# Patient Record
Sex: FEMALE
[De-identification: names, ages, dates, MRNs, and addresses within clinical notes are randomized per-mention and may not be internally consistent; named-entity substitution may affect disease eponyms.]

---

## 2017-06-04 ENCOUNTER — LAB SERVICES (OUTPATIENT)
Dept: OTHER | Age: 27
End: 2017-06-04

## 2017-06-04 ENCOUNTER — CHARTING TRANS (OUTPATIENT)
Dept: OTHER | Age: 27
End: 2017-06-04

## 2017-06-07 LAB
CULTURE STREP GRP A (STTH) HL: NORMAL
RAPID STREP GROUP A: NORMAL

## 2018-11-03 VITALS
RESPIRATION RATE: 16 BRPM | TEMPERATURE: 98.4 F | HEIGHT: 72 IN | BODY MASS INDEX: 26.67 KG/M2 | DIASTOLIC BLOOD PRESSURE: 68 MMHG | HEART RATE: 82 BPM | SYSTOLIC BLOOD PRESSURE: 106 MMHG | WEIGHT: 196.88 LBS

## 2023-03-02 ENCOUNTER — APPOINTMENT (OUTPATIENT)
Dept: CT IMAGING | Facility: HOSPITAL | Age: 33
End: 2023-03-02
Attending: EMERGENCY MEDICINE
Payer: COMMERCIAL

## 2023-03-02 ENCOUNTER — APPOINTMENT (OUTPATIENT)
Dept: GENERAL RADIOLOGY | Facility: HOSPITAL | Age: 33
End: 2023-03-02
Attending: EMERGENCY MEDICINE
Payer: COMMERCIAL

## 2023-03-02 ENCOUNTER — HOSPITAL ENCOUNTER (INPATIENT)
Facility: HOSPITAL | Age: 33
LOS: 1 days | Discharge: HOME | End: 2023-03-03
Attending: EMERGENCY MEDICINE | Admitting: HOSPITALIST
Payer: COMMERCIAL

## 2023-03-02 ENCOUNTER — APPOINTMENT (OUTPATIENT)
Dept: ULTRASOUND IMAGING | Facility: HOSPITAL | Age: 33
End: 2023-03-02
Attending: HOSPITALIST
Payer: COMMERCIAL

## 2023-03-02 DIAGNOSIS — I26.93 SINGLE SUBSEGMENTAL PULMONARY EMBOLISM WITHOUT ACUTE COR PULMONALE (HCC): Primary | ICD-10-CM

## 2023-03-02 DIAGNOSIS — I26.94 MULTIPLE SUBSEGMENTAL PULMONARY EMBOLI WITHOUT ACUTE COR PULMONALE (HCC): ICD-10-CM

## 2023-03-02 PROBLEM — I26.99 PULMONARY EMBOLI (HCC): Status: ACTIVE | Noted: 2023-03-02

## 2023-03-02 LAB
ALBUMIN SERPL-MCNC: 3.2 G/DL (ref 3.4–5)
ALBUMIN/GLOB SERPL: 0.6 {RATIO} (ref 1–2)
ALP LIVER SERPL-CCNC: 77 U/L
ALT SERPL-CCNC: 58 U/L
ANION GAP SERPL CALC-SCNC: 4 MMOL/L (ref 0–18)
ANION GAP SERPL CALC-SCNC: 5 MMOL/L (ref 0–18)
APTT PPP: 110.2 SECONDS (ref 23.3–35.6)
APTT PPP: 27.5 SECONDS (ref 23.3–35.6)
AST SERPL-CCNC: 44 U/L (ref 15–37)
ATRIAL RATE: 92 BPM
B-HCG UR QL: NEGATIVE
BASOPHILS # BLD AUTO: 0.01 X10(3) UL (ref 0–0.2)
BASOPHILS NFR BLD AUTO: 0.4 %
BILIRUB SERPL-MCNC: 0.5 MG/DL (ref 0.1–2)
BUN BLD-MCNC: 6 MG/DL (ref 7–18)
BUN BLD-MCNC: 7 MG/DL (ref 7–18)
CALCIUM BLD-MCNC: 8.5 MG/DL (ref 8.5–10.1)
CALCIUM BLD-MCNC: 8.6 MG/DL (ref 8.5–10.1)
CHLORIDE SERPL-SCNC: 113 MMOL/L (ref 98–112)
CHLORIDE SERPL-SCNC: 116 MMOL/L (ref 98–112)
CO2 SERPL-SCNC: 20 MMOL/L (ref 21–32)
CO2 SERPL-SCNC: 21 MMOL/L (ref 21–32)
CREAT BLD-MCNC: 0.48 MG/DL
CREAT BLD-MCNC: 0.55 MG/DL
D DIMER PPP FEU-MCNC: 1.73 UG/ML FEU (ref ?–0.5)
EOSINOPHIL # BLD AUTO: 0.01 X10(3) UL (ref 0–0.7)
EOSINOPHIL NFR BLD AUTO: 0.4 %
ERYTHROCYTE [DISTWIDTH] IN BLOOD BY AUTOMATED COUNT: 13.1 %
ERYTHROCYTE [DISTWIDTH] IN BLOOD BY AUTOMATED COUNT: 13.4 %
GFR SERPLBLD BASED ON 1.73 SQ M-ARVRAT: 125 ML/MIN/1.73M2 (ref 60–?)
GFR SERPLBLD BASED ON 1.73 SQ M-ARVRAT: 129 ML/MIN/1.73M2 (ref 60–?)
GLOBULIN PLAS-MCNC: 5.2 G/DL (ref 2.8–4.4)
GLUCOSE BLD-MCNC: 101 MG/DL (ref 70–99)
GLUCOSE BLD-MCNC: 78 MG/DL (ref 70–99)
HCT VFR BLD AUTO: 24.2 %
HCT VFR BLD AUTO: 24.3 %
HELMET CELLS BLD QL SMEAR: PRESENT
HGB BLD-MCNC: 8.9 G/DL
HGB BLD-MCNC: 9.1 G/DL
IMM GRANULOCYTES # BLD AUTO: 0.01 X10(3) UL (ref 0–1)
IMM GRANULOCYTES NFR BLD: 0.4 %
INR BLD: 1.03 (ref 0.85–1.16)
LYMPHOCYTES # BLD AUTO: 1.59 X10(3) UL (ref 1–4)
LYMPHOCYTES NFR BLD AUTO: 57.2 %
MCH RBC QN AUTO: 44.3 PG (ref 26–34)
MCH RBC QN AUTO: 45 PG (ref 26–34)
MCHC RBC AUTO-ENTMCNC: 36.6 G/DL (ref 31–37)
MCHC RBC AUTO-ENTMCNC: 37.6 G/DL (ref 31–37)
MCV RBC AUTO: 119.8 FL
MCV RBC AUTO: 120.9 FL
MONOCYTES # BLD AUTO: 0.17 X10(3) UL (ref 0.1–1)
MONOCYTES NFR BLD AUTO: 6.1 %
NEUTROPHILS # BLD AUTO: 0.99 X10 (3) UL (ref 1.5–7.7)
NEUTROPHILS # BLD AUTO: 0.99 X10(3) UL (ref 1.5–7.7)
NEUTROPHILS NFR BLD AUTO: 35.5 %
NRBC # BLD AUTO: 0.19 X10(3) UL
NRBC # BLD AUTO: 0.19 X10(3) UL
NRBC BLD-RTO: 6.8 %
NRBC BLD-RTO: 7 %
OSMOLALITY SERPL CALC.SUM OF ELEC: 280 MOSM/KG (ref 275–295)
OSMOLALITY SERPL CALC.SUM OF ELEC: 292 MOSM/KG (ref 275–295)
P AXIS: 59 DEGREES
P-R INTERVAL: 156 MS
PLATELET # BLD AUTO: 428 10(3)UL (ref 150–450)
PLATELET # BLD AUTO: 437 10(3)UL (ref 150–450)
PLATELET MORPHOLOGY: NORMAL
POTASSIUM SERPL-SCNC: 3.3 MMOL/L (ref 3.5–5.1)
POTASSIUM SERPL-SCNC: 3.5 MMOL/L (ref 3.5–5.1)
PROT SERPL-MCNC: 8.4 G/DL (ref 6.4–8.2)
PROTHROMBIN TIME: 13.5 SECONDS (ref 11.6–14.8)
Q-T INTERVAL: 388 MS
QRS DURATION: 86 MS
QTC CALCULATION (BEZET): 479 MS
R AXIS: 47 DEGREES
RBC # BLD AUTO: 2.01 X10(6)UL
RBC # BLD AUTO: 2.02 X10(6)UL
SARS-COV-2 RNA RESP QL NAA+PROBE: NOT DETECTED
SODIUM SERPL-SCNC: 137 MMOL/L (ref 136–145)
SODIUM SERPL-SCNC: 142 MMOL/L (ref 136–145)
T AXIS: 45 DEGREES
TROPONIN I HIGH SENSITIVITY: 3 NG/L
VENTRICULAR RATE: 92 BPM
WBC # BLD AUTO: 2.8 X10(3) UL (ref 4–11)
WBC # BLD AUTO: 5.9 X10(3) UL (ref 4–11)

## 2023-03-02 PROCEDURE — 93970 EXTREMITY STUDY: CPT | Performed by: HOSPITALIST

## 2023-03-02 PROCEDURE — 99223 1ST HOSP IP/OBS HIGH 75: CPT | Performed by: HOSPITALIST

## 2023-03-02 PROCEDURE — 71046 X-RAY EXAM CHEST 2 VIEWS: CPT | Performed by: EMERGENCY MEDICINE

## 2023-03-02 PROCEDURE — 71260 CT THORAX DX C+: CPT | Performed by: EMERGENCY MEDICINE

## 2023-03-02 RX ORDER — IBUPROFEN 600 MG/1
600 TABLET ORAL EVERY 6 HOURS PRN
COMMUNITY
Start: 2023-01-06 | End: 2023-03-03

## 2023-03-02 RX ORDER — SODIUM CHLORIDE 9 MG/ML
INJECTION, SOLUTION INTRAVENOUS CONTINUOUS
Status: DISCONTINUED | OUTPATIENT
Start: 2023-03-02 | End: 2023-03-03

## 2023-03-02 RX ORDER — ETONOGESTREL/ETHINYL ESTRADIOL .12-.015MG
1 RING, VAGINAL VAGINAL
COMMUNITY
Start: 2022-12-30 | End: 2023-03-03

## 2023-03-02 RX ORDER — HYDROCODONE BITARTRATE AND ACETAMINOPHEN 10; 325 MG/1; MG/1
1 TABLET ORAL EVERY 4 HOURS PRN
Status: DISCONTINUED | OUTPATIENT
Start: 2023-03-02 | End: 2023-03-03

## 2023-03-02 RX ORDER — HYDROXYUREA 500 MG/1
2000 CAPSULE ORAL DAILY
Status: DISCONTINUED | OUTPATIENT
Start: 2023-03-02 | End: 2023-03-03

## 2023-03-02 RX ORDER — POTASSIUM CHLORIDE 20 MEQ/1
40 TABLET, EXTENDED RELEASE ORAL EVERY 4 HOURS
Status: COMPLETED | OUTPATIENT
Start: 2023-03-02 | End: 2023-03-02

## 2023-03-02 RX ORDER — ACETAMINOPHEN 500 MG
500 TABLET ORAL EVERY 4 HOURS PRN
Status: DISCONTINUED | OUTPATIENT
Start: 2023-03-02 | End: 2023-03-03

## 2023-03-02 RX ORDER — HEPARIN SODIUM 1000 [USP'U]/ML
80 INJECTION, SOLUTION INTRAVENOUS; SUBCUTANEOUS ONCE
Status: COMPLETED | OUTPATIENT
Start: 2023-03-02 | End: 2023-03-02

## 2023-03-02 RX ORDER — BENZONATATE 100 MG/1
200 CAPSULE ORAL 3 TIMES DAILY PRN
Status: DISCONTINUED | OUTPATIENT
Start: 2023-03-02 | End: 2023-03-03

## 2023-03-02 RX ORDER — ECHINACEA PURPUREA EXTRACT 125 MG
1 TABLET ORAL
Status: DISCONTINUED | OUTPATIENT
Start: 2023-03-02 | End: 2023-03-03

## 2023-03-02 RX ORDER — BISACODYL 10 MG
10 SUPPOSITORY, RECTAL RECTAL
Status: DISCONTINUED | OUTPATIENT
Start: 2023-03-02 | End: 2023-03-03

## 2023-03-02 RX ORDER — HYDROCODONE BITARTRATE AND ACETAMINOPHEN 10; 325 MG/1; MG/1
1 TABLET ORAL EVERY 4 HOURS PRN
COMMUNITY
Start: 2023-02-10

## 2023-03-02 RX ORDER — ONDANSETRON 2 MG/ML
8 INJECTION INTRAMUSCULAR; INTRAVENOUS EVERY 6 HOURS PRN
Status: DISCONTINUED | OUTPATIENT
Start: 2023-03-02 | End: 2023-03-03

## 2023-03-02 RX ORDER — HYDROXYUREA 500 MG/1
2000 CAPSULE ORAL DAILY
COMMUNITY

## 2023-03-02 RX ORDER — MELATONIN
3 NIGHTLY PRN
Status: DISCONTINUED | OUTPATIENT
Start: 2023-03-02 | End: 2023-03-03

## 2023-03-02 RX ORDER — POLYETHYLENE GLYCOL 3350 17 G/17G
17 POWDER, FOR SOLUTION ORAL DAILY PRN
Status: DISCONTINUED | OUTPATIENT
Start: 2023-03-02 | End: 2023-03-03

## 2023-03-02 RX ORDER — HEPARIN SODIUM AND DEXTROSE 10000; 5 [USP'U]/100ML; G/100ML
INJECTION INTRAVENOUS CONTINUOUS
Status: DISCONTINUED | OUTPATIENT
Start: 2023-03-02 | End: 2023-03-03

## 2023-03-02 RX ORDER — SENNOSIDES 8.6 MG
17.2 TABLET ORAL NIGHTLY PRN
Status: DISCONTINUED | OUTPATIENT
Start: 2023-03-02 | End: 2023-03-03

## 2023-03-02 RX ORDER — HEPARIN SODIUM AND DEXTROSE 10000; 5 [USP'U]/100ML; G/100ML
18 INJECTION INTRAVENOUS ONCE
Status: COMPLETED | OUTPATIENT
Start: 2023-03-02 | End: 2023-03-02

## 2023-03-02 NOTE — ED INITIAL ASSESSMENT (HPI)
Patient to ER w/ complaints of sharp right sided chest pain that occurs when she takes a deep breath. intermittent for the last few weeks.

## 2023-03-02 NOTE — ED QUICK NOTES
Orders for admission, patient is aware of plan and ready to go upstairs. Any questions, please call ED RN zaina rn at extension [phone]. Patient Covid vaccination status: Unvaccinated     COVID Test Ordered in ED: Rapid SARS-CoV-2 by PCR    COVID Suspicion at Admission: Low clinical suspicion for COVID    Running Infusions:  Heparin at 1600u/hr    Mental Status/LOC at time of transport: A/O x4, SCHULTZ x4.  Steady gait, ambulatory    Other pertinent information:   CIWA score: N/A   NIH score:  N/A

## 2023-03-03 VITALS
HEIGHT: 72 IN | WEIGHT: 195 LBS | DIASTOLIC BLOOD PRESSURE: 66 MMHG | RESPIRATION RATE: 14 BRPM | SYSTOLIC BLOOD PRESSURE: 120 MMHG | OXYGEN SATURATION: 98 % | HEART RATE: 80 BPM | BODY MASS INDEX: 26.41 KG/M2 | TEMPERATURE: 99 F

## 2023-03-03 LAB
ANION GAP SERPL CALC-SCNC: 2 MMOL/L (ref 0–18)
APTT PPP: 89 SECONDS (ref 23.3–35.6)
BUN BLD-MCNC: 7 MG/DL (ref 7–18)
CALCIUM BLD-MCNC: 8.6 MG/DL (ref 8.5–10.1)
CHLORIDE SERPL-SCNC: 114 MMOL/L (ref 98–112)
CO2 SERPL-SCNC: 23 MMOL/L (ref 21–32)
CREAT BLD-MCNC: 0.42 MG/DL
ERYTHROCYTE [DISTWIDTH] IN BLOOD BY AUTOMATED COUNT: 13.1 %
GFR SERPLBLD BASED ON 1.73 SQ M-ARVRAT: 133 ML/MIN/1.73M2 (ref 60–?)
GLUCOSE BLD-MCNC: 85 MG/DL (ref 70–99)
HCT VFR BLD AUTO: 23.8 %
HGB BLD-MCNC: 8.9 G/DL
MCH RBC QN AUTO: 44.7 PG (ref 26–34)
MCHC RBC AUTO-ENTMCNC: 37.4 G/DL (ref 31–37)
MCV RBC AUTO: 119.6 FL
OSMOLALITY SERPL CALC.SUM OF ELEC: 285 MOSM/KG (ref 275–295)
PLATELET # BLD AUTO: 419 10(3)UL (ref 150–450)
PLATELET # BLD AUTO: 419 10(3)UL (ref 150–450)
POTASSIUM SERPL-SCNC: 3.7 MMOL/L (ref 3.5–5.1)
POTASSIUM SERPL-SCNC: 3.7 MMOL/L (ref 3.5–5.1)
RBC # BLD AUTO: 1.99 X10(6)UL
SODIUM SERPL-SCNC: 139 MMOL/L (ref 136–145)
WBC # BLD AUTO: 6 X10(3) UL (ref 4–11)

## 2023-03-03 PROCEDURE — 99255 IP/OBS CONSLTJ NEW/EST HI 80: CPT | Performed by: SPECIALIST

## 2023-03-03 PROCEDURE — 99238 HOSP IP/OBS DSCHRG MGMT 30/<: CPT | Performed by: HOSPITALIST

## 2023-03-03 RX ORDER — HEPARIN SODIUM AND DEXTROSE 10000; 5 [USP'U]/100ML; G/100ML
INJECTION INTRAVENOUS CONTINUOUS
Status: DISCONTINUED | OUTPATIENT
Start: 2023-03-03 | End: 2023-03-03

## 2023-03-03 RX ORDER — POTASSIUM CHLORIDE 20 MEQ/1
40 TABLET, EXTENDED RELEASE ORAL ONCE
Status: COMPLETED | OUTPATIENT
Start: 2023-03-03 | End: 2023-03-03

## 2023-03-03 NOTE — PROGRESS NOTES
Patient seen and examined. Discharge if ok with consultants. Hospitalist portion of med rec completed.   eliquis per hem/onc    Patti Luis MD  [facility]  Internal Medicine Hospitalist

## 2023-03-03 NOTE — PLAN OF CARE
Pt stable overnight. Heparin infusing per PE/DVT protocol. PTT therapeutic. BLE US (-) DVT. [facility] [place]. VSS. Remains on RA w/ O2 sats >92%. Afebrile. NSR on tele. K replaced per protocol. Pt updated on poc. Will cont to monitor. Problem: Patient/Family Goals  Goal: Patient/Family Long Term Goal  Description: Patient's Long Term Goal:    Interventions:  -  - See additional Care Plan goals for specific interventions  Outcome: Progressing  Goal: Patient/Family Short Term Goal  Description: Patient's Short Term Goal:     Interventions:   -  - See additional Care Plan goals for specific interventions  Outcome: Progressing     Problem: CARDIOVASCULAR - ADULT  Goal: Maintains optimal cardiac output and hemodynamic stability  Description: INTERVENTIONS:  - Monitor vital signs, rhythm, and trends  - Monitor for bleeding, hypotension and signs of decreased cardiac output  - Evaluate effectiveness of vasoactive medications to optimize hemodynamic stability  - Monitor arterial and/or venous puncture sites for bleeding and/or hematoma  - Assess quality of pulses, skin color and temperature  - Assess for signs of decreased coronary artery perfusion - ex.  Angina  - Evaluate fluid balance, assess for edema, trend weights  Outcome: Progressing  Goal: Absence of cardiac arrhythmias or at baseline  Description: INTERVENTIONS:  - Continuous cardiac monitoring, monitor vital signs, obtain 12 lead EKG if indicated  - Evaluate effectiveness of antiarrhythmic and heart rate control medications as ordered  - Initiate emergency measures for life threatening arrhythmias  - Monitor electrolytes and administer replacement therapy as ordered  Outcome: Progressing     Problem: RESPIRATORY - ADULT  Goal: Achieves optimal ventilation and oxygenation  Description: INTERVENTIONS:  - Assess for changes in respiratory status  - Assess for changes in mentation and behavior  - Position to facilitate oxygenation and minimize respiratory effort  - Oxygen supplementation based on oxygen saturation or ABGs  - Provide Smoking Cessation handout, if applicable  - Encourage broncho-pulmonary hygiene including cough, deep breathe, Incentive Spirometry  - Assess the need for suctioning and perform as needed  - Assess and instruct to report SOB or any respiratory difficulty  - Respiratory Therapy support as indicated  - Manage/alleviate anxiety  - Monitor for signs/symptoms of CO2 retention  Outcome: Progressing

## 2023-03-03 NOTE — CM/SW NOTE
Script for eliquis sent to [employer] with insurance $100. oo--await to see if able to apply 30-day free savings coupon; will also qualify for co-savings card that would need to be activated prior to use    [address] [place] [address] able to use free 30-day free card; co-savings card also given to patient which requires activation prior to use

## 2023-03-03 NOTE — PROGRESS NOTES
NURSING ADMISSION NOTE      Patient admitted via Cart  Oriented to room. Safety precautions initiated. Bed in low position. Call light in reach. Pt arrived to unit around 1400. Admission navigator completed. Heparin gtt infusing. K+ replaced per protocol. STAT US BLE ordered- pending. No oxygen needs, SOB improved, chest pain improved. Had mild headache- tylenol with relief. IVF initiated. Heme consulted. Gyne consulted but will follow up outpt, no inpatient interventions. Nuvaring removed per order. Pt updated on plan of care.

## 2023-03-03 NOTE — PLAN OF CARE
Assumed care at 0900. Pt A/O x4. RA. NSR on tele. VSS. Heparin gtt dc'd. Eliquis started. Goss checked on Eliquis. Pt to receive the first 30 days free and then get 10 dollar coupon card. All consults cleared for discharge. Discharge instructions reviewed with pt. IV removed. Tele removed. Pt discharged home via wheel chair.

## 2023-03-06 ENCOUNTER — PATIENT OUTREACH (OUTPATIENT)
Dept: CASE MANAGEMENT | Age: 33
End: 2023-03-06

## 2023-03-06 DIAGNOSIS — Z02.9 ENCOUNTERS FOR UNSPECIFIED ADMINISTRATIVE PURPOSE: ICD-10-CM

## 2023-03-06 RX ORDER — ACETAMINOPHEN 500 MG
500 TABLET ORAL EVERY 6 HOURS PRN
COMMUNITY

## 2023-03-06 NOTE — PAYOR COMM NOTE
Discharge Notification    Patient Name: Kim Torres  Payor: [employer]  Subscriber #: [id number]  Authorization Number: [employer] Date/Time: 3/2/2023 8:49 AM  Discharge Date/Time: 3/3/2023 6:35 PM

## 2023-03-07 ENCOUNTER — TELEPHONE (OUTPATIENT)
Dept: OBGYN CLINIC | Facility: CLINIC | Age: 33
End: 2023-03-07

## 2023-03-07 NOTE — TELEPHONE ENCOUNTER
Patient calling to make an appt with Dr. Sarthak aFir as a new patient from a hospital visit. Said she needs to be seen soon. No new patient appts with her soon.

## 2023-03-07 NOTE — TELEPHONE ENCOUNTER
New Patient. Patient recently inpatient for acute PE likely related to Nuvaring use. Needs to establish care to discuss safe birth control options. Contacted patient. Assisted with scheduling appt.

## 2023-03-08 ENCOUNTER — OFFICE VISIT (OUTPATIENT)
Dept: INTERNAL MEDICINE CLINIC | Facility: CLINIC | Age: 33
End: 2023-03-08
Payer: COMMERCIAL

## 2023-03-08 VITALS
OXYGEN SATURATION: 98 % | DIASTOLIC BLOOD PRESSURE: 76 MMHG | WEIGHT: 191 LBS | SYSTOLIC BLOOD PRESSURE: 126 MMHG | TEMPERATURE: 97 F | BODY MASS INDEX: 25.87 KG/M2 | HEIGHT: 72 IN | HEART RATE: 91 BPM | RESPIRATION RATE: 16 BRPM

## 2023-03-08 DIAGNOSIS — I26.99 PULMONARY EMBOLISM, UNSPECIFIED CHRONICITY, UNSPECIFIED PULMONARY EMBOLISM TYPE, UNSPECIFIED WHETHER ACUTE COR PULMONALE PRESENT (HCC): Primary | ICD-10-CM

## 2023-03-08 DIAGNOSIS — D57.1 SICKLE CELL DISEASE WITHOUT CRISIS (HCC): ICD-10-CM

## 2023-03-08 PROCEDURE — 3078F DIAST BP <80 MM HG: CPT | Performed by: PHYSICIAN ASSISTANT

## 2023-03-08 PROCEDURE — 3008F BODY MASS INDEX DOCD: CPT | Performed by: PHYSICIAN ASSISTANT

## 2023-03-08 PROCEDURE — 3074F SYST BP LT 130 MM HG: CPT | Performed by: PHYSICIAN ASSISTANT

## 2023-03-08 PROCEDURE — 99212 OFFICE O/P EST SF 10 MIN: CPT | Performed by: PHYSICIAN ASSISTANT

## 2023-03-08 NOTE — PATIENT INSTRUCTIONS
Go to Primary care appointment as scheduled  See GYN tomorrow for discussion on contraceptives  See hematology next week as scheduled  Go to ER if you developed bloody stools, worsening chest pain, shortness of breath, leg swelling

## 2023-03-09 ENCOUNTER — OFFICE VISIT (OUTPATIENT)
Dept: OBGYN CLINIC | Facility: CLINIC | Age: 33
End: 2023-03-09
Payer: COMMERCIAL

## 2023-03-09 VITALS
HEIGHT: 72 IN | SYSTOLIC BLOOD PRESSURE: 118 MMHG | DIASTOLIC BLOOD PRESSURE: 70 MMHG | WEIGHT: 193 LBS | HEART RATE: 83 BPM | BODY MASS INDEX: 26.14 KG/M2 | RESPIRATION RATE: 16 BRPM

## 2023-03-09 DIAGNOSIS — Z30.09 BIRTH CONTROL COUNSELING: Primary | ICD-10-CM

## 2023-03-09 PROCEDURE — 3078F DIAST BP <80 MM HG: CPT | Performed by: NURSE PRACTITIONER

## 2023-03-09 PROCEDURE — 3074F SYST BP LT 130 MM HG: CPT | Performed by: NURSE PRACTITIONER

## 2023-03-09 PROCEDURE — 99203 OFFICE O/P NEW LOW 30 MIN: CPT | Performed by: NURSE PRACTITIONER

## 2023-03-09 PROCEDURE — 3008F BODY MASS INDEX DOCD: CPT | Performed by: NURSE PRACTITIONER

## 2023-03-09 RX ORDER — DROSPIRENONE 4 MG/1
1 TABLET, FILM COATED ORAL DAILY
Qty: 84 TABLET | Refills: 0 | Status: SHIPPED | OUTPATIENT
Start: 2023-03-09 | End: 2024-03-08

## 2023-03-09 RX ORDER — DROSPIRENONE 4 MG/1
1 TABLET, FILM COATED ORAL DAILY
Qty: 28 TABLET | Refills: 0 | COMMUNITY
Start: 2023-03-09 | End: 2024-03-08

## 2023-03-15 ENCOUNTER — OFFICE VISIT (OUTPATIENT)
Dept: INTERNAL MEDICINE CLINIC | Facility: CLINIC | Age: 33
End: 2023-03-15
Payer: COMMERCIAL

## 2023-03-15 VITALS
HEART RATE: 79 BPM | SYSTOLIC BLOOD PRESSURE: 122 MMHG | HEIGHT: 72 IN | RESPIRATION RATE: 16 BRPM | TEMPERATURE: 98 F | WEIGHT: 188 LBS | BODY MASS INDEX: 25.47 KG/M2 | OXYGEN SATURATION: 98 % | DIASTOLIC BLOOD PRESSURE: 74 MMHG

## 2023-03-15 DIAGNOSIS — Z00.00 LABORATORY EXAMINATION ORDERED AS PART OF A ROUTINE GENERAL MEDICAL EXAMINATION: ICD-10-CM

## 2023-03-15 DIAGNOSIS — Z00.00 ANNUAL PHYSICAL EXAM: Primary | ICD-10-CM

## 2023-03-15 PROBLEM — I26.93 SINGLE SUBSEGMENTAL PULMONARY EMBOLISM WITHOUT ACUTE COR PULMONALE (HCC): Status: RESOLVED | Noted: 2023-03-02 | Resolved: 2023-03-15

## 2023-03-15 PROBLEM — I26.99 PULMONARY EMBOLI (HCC): Status: RESOLVED | Noted: 2023-03-02 | Resolved: 2023-03-15

## 2023-03-15 PROBLEM — I82.409 ACUTE EMBOLISM AND THROMBOSIS OF UNSPECIFIED DEEP VEINS OF UNSPECIFIED LOWER EXTREMITY (HCC): Status: ACTIVE | Noted: 2023-03-15

## 2023-03-15 PROCEDURE — 3008F BODY MASS INDEX DOCD: CPT | Performed by: NURSE PRACTITIONER

## 2023-03-15 PROCEDURE — 3078F DIAST BP <80 MM HG: CPT | Performed by: NURSE PRACTITIONER

## 2023-03-15 PROCEDURE — 3074F SYST BP LT 130 MM HG: CPT | Performed by: NURSE PRACTITIONER

## 2023-03-15 PROCEDURE — 99395 PREV VISIT EST AGE 18-39: CPT | Performed by: NURSE PRACTITIONER

## 2023-03-17 ENCOUNTER — TELEPHONE (OUTPATIENT)
Dept: INTERNAL MEDICINE CLINIC | Facility: CLINIC | Age: 33
End: 2023-03-17

## 2023-03-17 NOTE — TELEPHONE ENCOUNTER
Received call from Dr. Shiv Handley (hematologist at [facility]) in regards to PE diagnosis. I have sent a secure chat message to Dr. Bard Whalen in regards to hematologist question of PE or could finding be related to her sickle cell disease. I am awaiting response from Dr. Bard Whalen.  Dr. Celestine Randolph cell phone is [phone]

## 2023-03-20 ENCOUNTER — TELEPHONE (OUTPATIENT)
Dept: INTERNAL MEDICINE CLINIC | Facility: CLINIC | Age: 33
End: 2023-03-20

## 2023-03-20 NOTE — TELEPHONE ENCOUNTER
Radiologist confirmed that PE is present on CTA.  I have messaged Dr. Johana Persaud with this confirmation

## 2023-06-22 DIAGNOSIS — Z30.09 BIRTH CONTROL COUNSELING: ICD-10-CM

## 2023-06-26 ENCOUNTER — OFFICE VISIT (OUTPATIENT)
Dept: HEMATOLOGY/ONCOLOGY | Facility: HOSPITAL | Age: 33
End: 2023-06-26
Attending: INTERNAL MEDICINE
Payer: COMMERCIAL

## 2023-06-26 VITALS
HEIGHT: 72 IN | SYSTOLIC BLOOD PRESSURE: 113 MMHG | BODY MASS INDEX: 24.52 KG/M2 | TEMPERATURE: 99 F | RESPIRATION RATE: 16 BRPM | OXYGEN SATURATION: 100 % | HEART RATE: 80 BPM | DIASTOLIC BLOOD PRESSURE: 69 MMHG | WEIGHT: 181 LBS

## 2023-06-26 DIAGNOSIS — D57.1 SICKLE CELL DISEASE WITHOUT CRISIS (HCC): Primary | ICD-10-CM

## 2023-06-26 DIAGNOSIS — D57.00 ANEMIA, SICKLE CELL WITH CRISIS (HCC): ICD-10-CM

## 2023-06-26 DIAGNOSIS — Z86.711 HISTORY OF PULMONARY EMBOLISM: ICD-10-CM

## 2023-06-26 LAB
ALBUMIN SERPL-MCNC: 4.1 G/DL (ref 3.4–5)
ALBUMIN/GLOB SERPL: 0.8 {RATIO} (ref 1–2)
ALP LIVER SERPL-CCNC: 70 U/L
ALT SERPL-CCNC: 49 U/L
ANION GAP SERPL CALC-SCNC: 5 MMOL/L (ref 0–18)
AST SERPL-CCNC: 34 U/L (ref 15–37)
BASOPHILS # BLD AUTO: 0.01 X10(3) UL (ref 0–0.2)
BASOPHILS NFR BLD AUTO: 0.2 %
BILIRUB SERPL-MCNC: 0.9 MG/DL (ref 0.1–2)
BUN BLD-MCNC: 7 MG/DL (ref 7–18)
CALCIUM BLD-MCNC: 9.1 MG/DL (ref 8.5–10.1)
CHLORIDE SERPL-SCNC: 112 MMOL/L (ref 98–112)
CO2 SERPL-SCNC: 21 MMOL/L (ref 21–32)
CREAT BLD-MCNC: 0.59 MG/DL
EOSINOPHIL # BLD AUTO: 0 X10(3) UL (ref 0–0.7)
EOSINOPHIL NFR BLD AUTO: 0 %
ERYTHROCYTE [DISTWIDTH] IN BLOOD BY AUTOMATED COUNT: 12.4 %
GFR SERPLBLD BASED ON 1.73 SQ M-ARVRAT: 123 ML/MIN/1.73M2 (ref 60–?)
GLOBULIN PLAS-MCNC: 5 G/DL (ref 2.8–4.4)
GLUCOSE BLD-MCNC: 86 MG/DL (ref 70–99)
HCT VFR BLD AUTO: 24.9 %
HELMET CELLS BLD QL SMEAR: PRESENT
HGB BLD-MCNC: 9.3 G/DL
IMM GRANULOCYTES # BLD AUTO: 0.01 X10(3) UL (ref 0–1)
IMM GRANULOCYTES NFR BLD: 0.2 %
LDH SERPL L TO P-CCNC: 273 U/L
LYMPHOCYTES # BLD AUTO: 3.13 X10(3) UL (ref 1–4)
LYMPHOCYTES NFR BLD AUTO: 60.4 %
MCH RBC QN AUTO: 47 PG (ref 26–34)
MCHC RBC AUTO-ENTMCNC: 37.3 G/DL (ref 31–37)
MCV RBC AUTO: 125.8 FL
MONOCYTES # BLD AUTO: 0.14 X10(3) UL (ref 0.1–1)
MONOCYTES NFR BLD AUTO: 2.7 %
NEUTROPHILS # BLD AUTO: 1.89 X10 (3) UL (ref 1.5–7.7)
NEUTROPHILS # BLD AUTO: 1.89 X10(3) UL (ref 1.5–7.7)
NEUTROPHILS NFR BLD AUTO: 36.5 %
OSMOLALITY SERPL CALC.SUM OF ELEC: 283 MOSM/KG (ref 275–295)
PLATELET # BLD AUTO: 366 10(3)UL (ref 150–450)
PLATELET MORPHOLOGY: NORMAL
POTASSIUM SERPL-SCNC: 3.8 MMOL/L (ref 3.5–5.1)
PROT SERPL-MCNC: 9.1 G/DL (ref 6.4–8.2)
RBC # BLD AUTO: 1.98 X10(6)UL
SODIUM SERPL-SCNC: 138 MMOL/L (ref 136–145)
WBC # BLD AUTO: 5.2 X10(3) UL (ref 4–11)

## 2023-06-26 PROCEDURE — 99215 OFFICE O/P EST HI 40 MIN: CPT | Performed by: INTERNAL MEDICINE

## 2023-06-26 RX ORDER — IBUPROFEN 600 MG/1
TABLET ORAL
COMMUNITY
Start: 2023-06-25

## 2023-06-26 RX ORDER — FOLIC ACID 1 MG/1
1 TABLET ORAL DAILY
Qty: 90 TABLET | Refills: 3 | Status: SHIPPED | OUTPATIENT
Start: 2023-06-26

## 2023-06-26 NOTE — PROGRESS NOTES
Patient is here for consultation for sickle cell disease. She had a PE earlier in the year related to her BCP. She has completed a 3 month course of eliquis. She is now on a new mini-pill birth control. She reports that she has crisis pain about two times per month. She gets pain in her chest or low back. She had a knee injury at one point and then had some crisis pain there but that is not common. She manages the crisis with Norco and ibuprofen. 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      Education Record    Learner:  Patient    Disease / Diagnosis: sickle cell disease    Barriers / Limitations:  None   Comments:    Method:  Discussion   Comments:    General Topics:  Side effects and symptom management   Comments:    Outcome:  Shows understanding   Comments:

## 2023-06-27 RX ORDER — DROSPIRENONE 4 MG/1
1 TABLET, FILM COATED ORAL DAILY
Qty: 84 TABLET | Refills: 0 | Status: SHIPPED | OUTPATIENT
Start: 2023-06-27 | End: 2024-06-26

## 2023-07-05 DIAGNOSIS — D57.1 SICKLE CELL DISEASE WITHOUT CRISIS (HCC): Primary | ICD-10-CM

## 2023-07-05 RX ORDER — HYDROXYUREA 500 MG/1
2000 CAPSULE ORAL DAILY
Qty: 120 CAPSULE | Refills: 5 | Status: SHIPPED | OUTPATIENT
Start: 2023-07-05

## 2023-07-24 ENCOUNTER — OFFICE VISIT (OUTPATIENT)
Dept: OBGYN CLINIC | Facility: CLINIC | Age: 33
End: 2023-07-24
Payer: COMMERCIAL

## 2023-07-24 VITALS
HEART RATE: 91 BPM | WEIGHT: 181 LBS | BODY MASS INDEX: 24.52 KG/M2 | DIASTOLIC BLOOD PRESSURE: 70 MMHG | HEIGHT: 72 IN | SYSTOLIC BLOOD PRESSURE: 114 MMHG

## 2023-07-24 DIAGNOSIS — Z30.09 COUNSELING FOR BIRTH CONTROL REGARDING INTRAUTERINE DEVICE (IUD): ICD-10-CM

## 2023-07-24 DIAGNOSIS — Z01.419 WELL WOMAN EXAM WITH ROUTINE GYNECOLOGICAL EXAM: Primary | ICD-10-CM

## 2023-07-24 DIAGNOSIS — Z11.3 SCREEN FOR STD (SEXUALLY TRANSMITTED DISEASE): ICD-10-CM

## 2023-07-24 PROCEDURE — 87491 CHLMYD TRACH DNA AMP PROBE: CPT | Performed by: NURSE PRACTITIONER

## 2023-07-24 PROCEDURE — 87591 N.GONORRHOEAE DNA AMP PROB: CPT | Performed by: NURSE PRACTITIONER

## 2023-07-24 RX ORDER — MISOPROSTOL 200 UG/1
TABLET ORAL
Qty: 2 TABLET | Refills: 0 | Status: SHIPPED | OUTPATIENT
Start: 2023-07-24

## 2023-07-24 NOTE — PROGRESS NOTES
Here for Routine Annual Exam  No concerns or questions. Menses are regular, no concerns with cycles. Contraception- OCP but having difficulty with compliance. Pap is up to date    ROS: No Cardiac, Respiratory, GI,  or Neurological symptoms. PE:  GENERAL: well developed, well nourished, in no apparent distress  SKIN: no rashes, no suspicious lesions  HEENT: normal  NECK: supple; no thyroidmegaly, no adenopathy  LUNGS: clear to auscultation  CARDIOVASCULAR: normal S1, S2, RRR  BREASTS: firm, nontendder, no palpable masses or nodes, no nipple discharge, no skin changes, no axillary adenopathy,    ABDOMEN: Soft, non distended; non tender, no masses  GYNE/: External Genitalia: Normal without lesions or erythema                      Vagina: normal without lesions, scant discharge                      Uterus: mid, mobile, non tender, normal size                     Cervix: no lesions or CMT                     Adnexa: non tender, no masses, normal size  EXTREMITIES:  non tender without edema    A/P:   1. Well woman exam with routine gynecological exam  Regular self breast exams recommended  Pap deferred    2. Screen for STD (sexually transmitted disease)  - CHLAMYDIA/GONOCOCCUS, INEZ; Future    3. Counseling for birth control regarding intrauterine device (IUD)  Advised on placement on menses, risk for infection, perforation, PID, ectopic and intrauterine pregnancy  We will place a Kyleena  - miSOPROStol 200 MCG Oral Tab; 1 pill PO the night before the procedure, 1 pill PO the morning of the procedure. Dispense: 2 tablet;  Refill: 0     Return to clinic 1 year for routine exam, or as needed with any concerns or question

## 2023-07-25 LAB
C TRACH DNA SPEC QL NAA+PROBE: NEGATIVE
N GONORRHOEA DNA SPEC QL NAA+PROBE: NEGATIVE

## 2023-08-03 RX ORDER — HYDROCODONE BITARTRATE AND ACETAMINOPHEN 10; 325 MG/1; MG/1
1 TABLET ORAL EVERY 4 HOURS PRN
Qty: 30 TABLET | Refills: 0 | Status: SHIPPED | OUTPATIENT
Start: 2023-08-03

## 2023-08-29 ENCOUNTER — OFFICE VISIT (OUTPATIENT)
Dept: OBGYN CLINIC | Facility: CLINIC | Age: 33
End: 2023-08-29
Payer: COMMERCIAL

## 2023-08-29 VITALS
HEART RATE: 95 BPM | SYSTOLIC BLOOD PRESSURE: 114 MMHG | WEIGHT: 185.38 LBS | DIASTOLIC BLOOD PRESSURE: 70 MMHG | BODY MASS INDEX: 25 KG/M2

## 2023-08-29 DIAGNOSIS — Z01.812 PRE-PROCEDURAL LABORATORY EXAMINATION: Primary | ICD-10-CM

## 2023-08-29 DIAGNOSIS — Z30.430 ENCOUNTER FOR INSERTION OF INTRAUTERINE CONTRACEPTIVE DEVICE (IUD): ICD-10-CM

## 2023-08-29 LAB
CONTROL LINE PRESENT WITH A CLEAR BACKGROUND (YES/NO): YES YES/NO
KIT LOT #: NORMAL NUMERIC

## 2023-08-29 PROCEDURE — 81025 URINE PREGNANCY TEST: CPT | Performed by: NURSE PRACTITIONER

## 2023-08-29 PROCEDURE — 3074F SYST BP LT 130 MM HG: CPT | Performed by: NURSE PRACTITIONER

## 2023-08-29 PROCEDURE — 3078F DIAST BP <80 MM HG: CPT | Performed by: NURSE PRACTITIONER

## 2023-08-29 PROCEDURE — 58300 INSERT INTRAUTERINE DEVICE: CPT | Performed by: NURSE PRACTITIONER

## 2023-09-25 ENCOUNTER — OFFICE VISIT (OUTPATIENT)
Dept: HEMATOLOGY/ONCOLOGY | Facility: HOSPITAL | Age: 33
End: 2023-09-25
Attending: INTERNAL MEDICINE
Payer: COMMERCIAL

## 2023-09-25 VITALS
HEART RATE: 73 BPM | OXYGEN SATURATION: 100 % | WEIGHT: 185 LBS | DIASTOLIC BLOOD PRESSURE: 72 MMHG | BODY MASS INDEX: 25 KG/M2 | SYSTOLIC BLOOD PRESSURE: 107 MMHG | TEMPERATURE: 98 F

## 2023-09-25 DIAGNOSIS — D57.1 SICKLE CELL DISEASE WITHOUT CRISIS (HCC): Primary | ICD-10-CM

## 2023-09-25 DIAGNOSIS — Z86.711 HISTORY OF PULMONARY EMBOLISM: ICD-10-CM

## 2023-09-25 PROCEDURE — 99214 OFFICE O/P EST MOD 30 MIN: CPT | Performed by: INTERNAL MEDICINE

## 2023-09-25 RX ORDER — HYDROCODONE BITARTRATE AND ACETAMINOPHEN 10; 325 MG/1; MG/1
1-2 TABLET ORAL EVERY 4 HOURS PRN
Qty: 60 TABLET | Refills: 0 | Status: SHIPPED | OUTPATIENT
Start: 2023-09-25 | End: 2023-09-27

## 2023-09-25 NOTE — PROGRESS NOTES
Patient is here for follow up for sickle cell disease. She stopped the xarelto a month ago when she had her IUD placed. She has not had any spotting or bleeding after IUD was placed. She follows up about this next week. She has had a increase in her pain this month but that is usual for her in lev with weather changing. She is tolerating the hydrea at the current dose.      Education Record    Learner:  Patient    Disease / Diagnosis: sickle cell disease    Barriers / Limitations:  None   Comments:    Method:  Discussion   Comments:    General Topics:  Side effects and symptom management   Comments:    Outcome:  Shows understanding   Comments:

## 2023-09-28 ENCOUNTER — OFFICE VISIT (OUTPATIENT)
Dept: OBGYN CLINIC | Facility: CLINIC | Age: 33
End: 2023-09-28
Payer: COMMERCIAL

## 2023-09-28 VITALS
WEIGHT: 183.38 LBS | DIASTOLIC BLOOD PRESSURE: 66 MMHG | SYSTOLIC BLOOD PRESSURE: 110 MMHG | BODY MASS INDEX: 25 KG/M2 | HEART RATE: 71 BPM

## 2023-09-28 DIAGNOSIS — Z30.431 IUD CHECK UP: Primary | ICD-10-CM

## 2023-09-28 PROBLEM — Z97.5 IUD (INTRAUTERINE DEVICE) IN PLACE: Status: ACTIVE | Noted: 2023-09-28

## 2023-09-28 PROCEDURE — 99213 OFFICE O/P EST LOW 20 MIN: CPT | Performed by: NURSE PRACTITIONER

## 2023-09-28 PROCEDURE — 3078F DIAST BP <80 MM HG: CPT | Performed by: NURSE PRACTITIONER

## 2023-09-28 PROCEDURE — 3074F SYST BP LT 130 MM HG: CPT | Performed by: NURSE PRACTITIONER

## 2023-11-06 ENCOUNTER — APPOINTMENT (OUTPATIENT)
Dept: GENERAL RADIOLOGY | Facility: HOSPITAL | Age: 33
End: 2023-11-06
Payer: COMMERCIAL

## 2023-11-06 ENCOUNTER — HOSPITAL ENCOUNTER (EMERGENCY)
Facility: HOSPITAL | Age: 33
Discharge: HOME | End: 2023-11-07
Attending: EMERGENCY MEDICINE
Payer: COMMERCIAL

## 2023-11-06 DIAGNOSIS — D57.00 SICKLE CELL DISEASE WITH CRISIS (HCC): Primary | ICD-10-CM

## 2023-11-06 LAB
ALBUMIN SERPL-MCNC: 3.8 G/DL (ref 3.4–5)
ALBUMIN/GLOB SERPL: 0.8 {RATIO} (ref 1–2)
ALP LIVER SERPL-CCNC: 70 U/L
ALT SERPL-CCNC: 36 U/L
ANION GAP SERPL CALC-SCNC: 4 MMOL/L (ref 0–18)
AST SERPL-CCNC: 26 U/L (ref 15–37)
BASOPHILS # BLD AUTO: 0.01 X10(3) UL (ref 0–0.2)
BASOPHILS NFR BLD AUTO: 0.2 %
BILIRUB SERPL-MCNC: 0.6 MG/DL (ref 0.1–2)
BUN BLD-MCNC: 7 MG/DL (ref 9–23)
CALCIUM BLD-MCNC: 8.8 MG/DL (ref 8.5–10.1)
CHLORIDE SERPL-SCNC: 112 MMOL/L (ref 98–112)
CO2 SERPL-SCNC: 23 MMOL/L (ref 21–32)
CREAT BLD-MCNC: 0.59 MG/DL
D DIMER PPP FEU-MCNC: 0.97 UG/ML FEU (ref ?–0.5)
EGFRCR SERPLBLD CKD-EPI 2021: 123 ML/MIN/1.73M2 (ref 60–?)
EOSINOPHIL # BLD AUTO: 0.02 X10(3) UL (ref 0–0.7)
EOSINOPHIL NFR BLD AUTO: 0.3 %
ERYTHROCYTE [DISTWIDTH] IN BLOOD BY AUTOMATED COUNT: 13.3 %
GLOBULIN PLAS-MCNC: 4.5 G/DL (ref 2.8–4.4)
GLUCOSE BLD-MCNC: 95 MG/DL (ref 70–99)
HCT VFR BLD AUTO: 24.8 %
HGB BLD-MCNC: 9.1 G/DL
HGB RETIC QN AUTO: 47.9 PG (ref 28.2–36.6)
IMM GRANULOCYTES # BLD AUTO: 0.01 X10(3) UL (ref 0–1)
IMM GRANULOCYTES NFR BLD: 0.2 %
IMM RETICS NFR: 0.37 RATIO (ref 0.1–0.3)
LYMPHOCYTES # BLD AUTO: 4.48 X10(3) UL (ref 1–4)
LYMPHOCYTES NFR BLD AUTO: 74.7 %
MCH RBC QN AUTO: 44.2 PG (ref 26–34)
MCHC RBC AUTO-ENTMCNC: 36.7 G/DL (ref 31–37)
MCV RBC AUTO: 120.4 FL
MONOCYTES # BLD AUTO: 0.2 X10(3) UL (ref 0.1–1)
MONOCYTES NFR BLD AUTO: 3.3 %
NEUTROPHILS # BLD AUTO: 1.28 X10 (3) UL (ref 1.5–7.7)
NEUTROPHILS # BLD AUTO: 1.28 X10(3) UL (ref 1.5–7.7)
NEUTROPHILS NFR BLD AUTO: 21.3 %
OSMOLALITY SERPL CALC.SUM OF ELEC: 286 MOSM/KG (ref 275–295)
PLATELET # BLD AUTO: 483 10(3)UL (ref 150–450)
POTASSIUM SERPL-SCNC: 3.6 MMOL/L (ref 3.5–5.1)
PROT SERPL-MCNC: 8.3 G/DL (ref 6.4–8.2)
RBC # BLD AUTO: 2.06 X10(6)UL
RETICS # AUTO: 99.5 X10(3) UL (ref 22.5–147.5)
RETICS/RBC NFR AUTO: 4.8 %
SODIUM SERPL-SCNC: 139 MMOL/L (ref 136–145)
TROPONIN I SERPL HS-MCNC: 4 NG/L
WBC # BLD AUTO: 6 X10(3) UL (ref 4–11)

## 2023-11-06 PROCEDURE — 93010 ELECTROCARDIOGRAM REPORT: CPT

## 2023-11-06 PROCEDURE — 99285 EMERGENCY DEPT VISIT HI MDM: CPT

## 2023-11-06 PROCEDURE — 93005 ELECTROCARDIOGRAM TRACING: CPT

## 2023-11-06 PROCEDURE — 85379 FIBRIN DEGRADATION QUANT: CPT | Performed by: EMERGENCY MEDICINE

## 2023-11-06 PROCEDURE — 85025 COMPLETE CBC W/AUTO DIFF WBC: CPT | Performed by: EMERGENCY MEDICINE

## 2023-11-06 PROCEDURE — 96374 THER/PROPH/DIAG INJ IV PUSH: CPT

## 2023-11-06 PROCEDURE — 80053 COMPREHEN METABOLIC PANEL: CPT

## 2023-11-06 PROCEDURE — 84484 ASSAY OF TROPONIN QUANT: CPT

## 2023-11-06 PROCEDURE — 96361 HYDRATE IV INFUSION ADD-ON: CPT

## 2023-11-06 PROCEDURE — 80053 COMPREHEN METABOLIC PANEL: CPT | Performed by: EMERGENCY MEDICINE

## 2023-11-06 PROCEDURE — 71045 X-RAY EXAM CHEST 1 VIEW: CPT

## 2023-11-06 PROCEDURE — 96375 TX/PRO/DX INJ NEW DRUG ADDON: CPT

## 2023-11-06 PROCEDURE — 84484 ASSAY OF TROPONIN QUANT: CPT | Performed by: EMERGENCY MEDICINE

## 2023-11-06 PROCEDURE — 85045 AUTOMATED RETICULOCYTE COUNT: CPT | Performed by: EMERGENCY MEDICINE

## 2023-11-06 PROCEDURE — 85025 COMPLETE CBC W/AUTO DIFF WBC: CPT

## 2023-11-06 RX ORDER — DIPHENHYDRAMINE HYDROCHLORIDE 50 MG/ML
50 INJECTION INTRAMUSCULAR; INTRAVENOUS ONCE
Status: COMPLETED | OUTPATIENT
Start: 2023-11-06 | End: 2023-11-06

## 2023-11-06 RX ORDER — HYDROMORPHONE HYDROCHLORIDE 1 MG/ML
1 INJECTION, SOLUTION INTRAMUSCULAR; INTRAVENOUS; SUBCUTANEOUS ONCE
Status: COMPLETED | OUTPATIENT
Start: 2023-11-06 | End: 2023-11-06

## 2023-11-07 ENCOUNTER — APPOINTMENT (OUTPATIENT)
Dept: CT IMAGING | Facility: HOSPITAL | Age: 33
End: 2023-11-07
Attending: EMERGENCY MEDICINE
Payer: COMMERCIAL

## 2023-11-07 VITALS
WEIGHT: 185 LBS | OXYGEN SATURATION: 100 % | HEIGHT: 72 IN | BODY MASS INDEX: 25.06 KG/M2 | RESPIRATION RATE: 15 BRPM | DIASTOLIC BLOOD PRESSURE: 60 MMHG | TEMPERATURE: 97 F | HEART RATE: 66 BPM | SYSTOLIC BLOOD PRESSURE: 96 MMHG

## 2023-11-07 LAB
ATRIAL RATE: 67 BPM
B-HCG UR QL: NEGATIVE
P AXIS: 54 DEGREES
P-R INTERVAL: 158 MS
Q-T INTERVAL: 450 MS
QRS DURATION: 88 MS
QTC CALCULATION (BEZET): 475 MS
R AXIS: 41 DEGREES
T AXIS: 40 DEGREES
VENTRICULAR RATE: 67 BPM

## 2023-11-07 PROCEDURE — 71275 CT ANGIOGRAPHY CHEST: CPT | Performed by: EMERGENCY MEDICINE

## 2023-11-07 PROCEDURE — 96376 TX/PRO/DX INJ SAME DRUG ADON: CPT

## 2023-11-07 PROCEDURE — 81025 URINE PREGNANCY TEST: CPT

## 2023-11-07 RX ORDER — HYDROCODONE BITARTRATE AND ACETAMINOPHEN 5; 325 MG/1; MG/1
1 TABLET ORAL EVERY 6 HOURS PRN
Qty: 20 TABLET | Refills: 0 | Status: SHIPPED | OUTPATIENT
Start: 2023-11-07

## 2023-11-07 RX ORDER — HYDROMORPHONE HYDROCHLORIDE 1 MG/ML
1 INJECTION, SOLUTION INTRAMUSCULAR; INTRAVENOUS; SUBCUTANEOUS ONCE
Status: COMPLETED | OUTPATIENT
Start: 2023-11-07 | End: 2023-11-07

## 2023-11-07 NOTE — ED INITIAL ASSESSMENT (HPI)
Pt presents to ER with sickle cell pain. Pt states back pain. Pt states started taking her pain medications last night, went to work, and pain started increasing. Pt has noted some chest pains that have come and gone over past 2 days. No SOB. Pt is speaking clearly. Skin warm and dry. Respirations equal and nonlabored. Pt is a&ox3. No fever.

## 2023-12-18 ENCOUNTER — OFFICE VISIT (OUTPATIENT)
Dept: HEMATOLOGY/ONCOLOGY | Facility: HOSPITAL | Age: 33
End: 2023-12-18
Attending: INTERNAL MEDICINE
Payer: COMMERCIAL

## 2023-12-18 VITALS
HEIGHT: 72 IN | BODY MASS INDEX: 25.12 KG/M2 | HEART RATE: 79 BPM | DIASTOLIC BLOOD PRESSURE: 75 MMHG | OXYGEN SATURATION: 100 % | WEIGHT: 185.5 LBS | TEMPERATURE: 98 F | SYSTOLIC BLOOD PRESSURE: 118 MMHG | RESPIRATION RATE: 18 BRPM

## 2023-12-18 DIAGNOSIS — Z86.711 HISTORY OF PULMONARY EMBOLISM: ICD-10-CM

## 2023-12-18 DIAGNOSIS — D57.1 SICKLE CELL DISEASE WITHOUT CRISIS (HCC): ICD-10-CM

## 2023-12-18 DIAGNOSIS — D57.00 ANEMIA, SICKLE CELL WITH CRISIS (HCC): ICD-10-CM

## 2023-12-18 LAB
BASOPHILS # BLD AUTO: 0.01 X10(3) UL (ref 0–0.2)
BASOPHILS NFR BLD AUTO: 0.2 %
EOSINOPHIL # BLD AUTO: 0.01 X10(3) UL (ref 0–0.7)
EOSINOPHIL NFR BLD AUTO: 0.2 %
ERYTHROCYTE [DISTWIDTH] IN BLOOD BY AUTOMATED COUNT: 12.8 %
HCT VFR BLD AUTO: 25.5 %
HGB BLD-MCNC: 9.3 G/DL
IMM GRANULOCYTES # BLD AUTO: 0.01 X10(3) UL (ref 0–1)
IMM GRANULOCYTES NFR BLD: 0.2 %
LYMPHOCYTES # BLD AUTO: 3.2 X10(3) UL (ref 1–4)
LYMPHOCYTES NFR BLD AUTO: 65.7 %
MCH RBC QN AUTO: 44.9 PG (ref 26–34)
MCHC RBC AUTO-ENTMCNC: 36.5 G/DL (ref 31–37)
MCV RBC AUTO: 123.2 FL
MONOCYTES # BLD AUTO: 0.21 X10(3) UL (ref 0.1–1)
MONOCYTES NFR BLD AUTO: 4.3 %
NEUTROPHILS # BLD AUTO: 1.43 X10 (3) UL (ref 1.5–7.7)
NEUTROPHILS # BLD AUTO: 1.43 X10(3) UL (ref 1.5–7.7)
NEUTROPHILS NFR BLD AUTO: 29.4 %
PLATELET # BLD AUTO: 388 10(3)UL (ref 150–450)
RBC # BLD AUTO: 2.07 X10(6)UL
WBC # BLD AUTO: 4.9 X10(3) UL (ref 4–11)

## 2023-12-18 PROCEDURE — 99214 OFFICE O/P EST MOD 30 MIN: CPT | Performed by: INTERNAL MEDICINE

## 2023-12-18 RX ORDER — IBUPROFEN 600 MG/1
600 TABLET ORAL EVERY 6 HOURS PRN
Qty: 120 TABLET | Refills: 11 | Status: SHIPPED | OUTPATIENT
Start: 2023-12-18

## 2023-12-18 RX ORDER — FOLIC ACID 1 MG/1
1 TABLET ORAL DAILY
Qty: 90 TABLET | Refills: 3 | Status: SHIPPED | OUTPATIENT
Start: 2023-12-18

## 2023-12-18 RX ORDER — HYDROCODONE BITARTRATE AND ACETAMINOPHEN 5; 325 MG/1; MG/1
1 TABLET ORAL EVERY 6 HOURS PRN
Qty: 120 TABLET | Refills: 0 | Status: SHIPPED | OUTPATIENT
Start: 2023-12-18

## 2023-12-18 RX ORDER — HYDROXYUREA 500 MG/1
2000 CAPSULE ORAL DAILY
Qty: 360 CAPSULE | Refills: 3 | Status: SHIPPED | OUTPATIENT
Start: 2023-12-18 | End: 2024-03-17

## 2023-12-18 NOTE — PROGRESS NOTES
Patient is here for follow up for sickle cell disease. She has been having some pain episodes recently with pain in her back. She is mostly able to manage these but did require one trip to ED recently. She was not admitted. She stopped the xarelto in July after she had IUD placed. She is using Norco 10/325 one or two as needed for pain. She is still being active-working and exercising when she can.      Education Record    Learner:  Patient    Disease / Diagnosis: sickle cell disease    Barriers / Limitations:  None   Comments:    Method:  Discussion   Comments:    General Topics:  Side effects and symptom management   Comments:    Outcome:  Shows understanding   Comments:

## 2024-03-15 ENCOUNTER — OFFICE VISIT (OUTPATIENT)
Dept: INTERNAL MEDICINE CLINIC | Facility: CLINIC | Age: 34
End: 2024-03-15
Payer: COMMERCIAL

## 2024-03-15 ENCOUNTER — LAB ENCOUNTER (OUTPATIENT)
Dept: LAB | Age: 34
End: 2024-03-15
Attending: NURSE PRACTITIONER
Payer: COMMERCIAL

## 2024-03-15 VITALS
RESPIRATION RATE: 18 BRPM | TEMPERATURE: 98 F | HEIGHT: 72 IN | BODY MASS INDEX: 24.43 KG/M2 | SYSTOLIC BLOOD PRESSURE: 114 MMHG | DIASTOLIC BLOOD PRESSURE: 76 MMHG | WEIGHT: 180.38 LBS | HEART RATE: 62 BPM | OXYGEN SATURATION: 98 %

## 2024-03-15 DIAGNOSIS — D57.1 SICKLE CELL DISEASE WITHOUT CRISIS (HCC): ICD-10-CM

## 2024-03-15 DIAGNOSIS — Z00.00 LABORATORY EXAMINATION ORDERED AS PART OF A ROUTINE GENERAL MEDICAL EXAMINATION: ICD-10-CM

## 2024-03-15 DIAGNOSIS — Z00.00 ANNUAL PHYSICAL EXAM: Primary | ICD-10-CM

## 2024-03-15 PROBLEM — I82.409 ACUTE EMBOLISM AND THROMBOSIS OF UNSPECIFIED DEEP VEINS OF UNSPECIFIED LOWER EXTREMITY (HCC): Status: RESOLVED | Noted: 2023-03-15 | Resolved: 2024-03-15

## 2024-03-15 PROBLEM — Z86.711 HISTORY OF PULMONARY EMBOLUS (PE): Status: ACTIVE | Noted: 2023-03-15

## 2024-03-15 LAB
ALBUMIN SERPL-MCNC: 3.9 G/DL (ref 3.4–5)
ALBUMIN/GLOB SERPL: 0.9 {RATIO} (ref 1–2)
ALP LIVER SERPL-CCNC: 67 U/L
ALT SERPL-CCNC: 28 U/L
ANION GAP SERPL CALC-SCNC: 4 MMOL/L (ref 0–18)
AST SERPL-CCNC: 41 U/L (ref 15–37)
BASOPHILS # BLD AUTO: 0.01 X10(3) UL (ref 0–0.2)
BASOPHILS NFR BLD AUTO: 0.3 %
BILIRUB SERPL-MCNC: 0.6 MG/DL (ref 0.1–2)
BILIRUB UR QL STRIP.AUTO: NEGATIVE
BUN BLD-MCNC: 7 MG/DL (ref 9–23)
CALCIUM BLD-MCNC: 8.8 MG/DL (ref 8.5–10.1)
CHLORIDE SERPL-SCNC: 114 MMOL/L (ref 98–112)
CHOLEST SERPL-MCNC: 88 MG/DL (ref ?–200)
CLARITY UR REFRACT.AUTO: CLEAR
CO2 SERPL-SCNC: 21 MMOL/L (ref 21–32)
CREAT BLD-MCNC: 0.46 MG/DL
EGFRCR SERPLBLD CKD-EPI 2021: 130 ML/MIN/1.73M2 (ref 60–?)
EOSINOPHIL # BLD AUTO: 0.01 X10(3) UL (ref 0–0.7)
EOSINOPHIL NFR BLD AUTO: 0.3 %
ERYTHROCYTE [DISTWIDTH] IN BLOOD BY AUTOMATED COUNT: 14.5 %
FASTING PATIENT LIPID ANSWER: YES
FASTING STATUS PATIENT QL REPORTED: YES
GLOBULIN PLAS-MCNC: 4.5 G/DL (ref 2.8–4.4)
GLUCOSE BLD-MCNC: 81 MG/DL (ref 70–99)
GLUCOSE UR STRIP.AUTO-MCNC: NORMAL MG/DL
HCT VFR BLD AUTO: 23.6 %
HDLC SERPL-MCNC: 43 MG/DL (ref 40–59)
HGB BLD-MCNC: 8.4 G/DL
IMM GRANULOCYTES # BLD AUTO: 0 X10(3) UL (ref 0–1)
IMM GRANULOCYTES NFR BLD: 0 %
KETONES UR STRIP.AUTO-MCNC: NEGATIVE MG/DL
LDLC SERPL CALC-MCNC: 33 MG/DL (ref ?–100)
LEUKOCYTE ESTERASE UR QL STRIP.AUTO: NEGATIVE
LYMPHOCYTES # BLD AUTO: 2.03 X10(3) UL (ref 1–4)
LYMPHOCYTES NFR BLD AUTO: 65.7 %
MCH RBC QN AUTO: 45.4 PG (ref 26–34)
MCHC RBC AUTO-ENTMCNC: 35.6 G/DL (ref 31–37)
MCV RBC AUTO: 127.6 FL
MONOCYTES # BLD AUTO: 0.12 X10(3) UL (ref 0.1–1)
MONOCYTES NFR BLD AUTO: 3.9 %
NEUTROPHILS # BLD AUTO: 0.92 X10 (3) UL (ref 1.5–7.7)
NEUTROPHILS # BLD AUTO: 0.92 X10(3) UL (ref 1.5–7.7)
NEUTROPHILS NFR BLD AUTO: 29.8 %
NITRITE UR QL STRIP.AUTO: NEGATIVE
NONHDLC SERPL-MCNC: 45 MG/DL (ref ?–130)
OSMOLALITY SERPL CALC.SUM OF ELEC: 285 MOSM/KG (ref 275–295)
PH UR STRIP.AUTO: 7 [PH] (ref 5–8)
PLATELET # BLD AUTO: 485 10(3)UL (ref 150–450)
PLATELET MORPHOLOGY: NORMAL
POTASSIUM SERPL-SCNC: 4.3 MMOL/L (ref 3.5–5.1)
PROT SERPL-MCNC: 8.4 G/DL (ref 6.4–8.2)
PROT UR STRIP.AUTO-MCNC: NEGATIVE MG/DL
RBC # BLD AUTO: 1.85 X10(6)UL
RBC UR QL AUTO: NEGATIVE
SODIUM SERPL-SCNC: 139 MMOL/L (ref 136–145)
SP GR UR STRIP.AUTO: 1.01 (ref 1–1.03)
TRIGL SERPL-MCNC: 46 MG/DL (ref 30–149)
TSI SER-ACNC: 0.78 MIU/ML (ref 0.36–3.74)
UROBILINOGEN UR STRIP.AUTO-MCNC: NORMAL MG/DL
VLDLC SERPL CALC-MCNC: 6 MG/DL (ref 0–30)
WBC # BLD AUTO: 3.1 X10(3) UL (ref 4–11)

## 2024-03-15 PROCEDURE — 90677 PCV20 VACCINE IM: CPT | Performed by: NURSE PRACTITIONER

## 2024-03-15 PROCEDURE — 81003 URINALYSIS AUTO W/O SCOPE: CPT

## 2024-03-15 PROCEDURE — 90471 IMMUNIZATION ADMIN: CPT | Performed by: NURSE PRACTITIONER

## 2024-03-15 PROCEDURE — 3078F DIAST BP <80 MM HG: CPT | Performed by: NURSE PRACTITIONER

## 2024-03-15 PROCEDURE — 84443 ASSAY THYROID STIM HORMONE: CPT

## 2024-03-15 PROCEDURE — 99395 PREV VISIT EST AGE 18-39: CPT | Performed by: NURSE PRACTITIONER

## 2024-03-15 PROCEDURE — 85025 COMPLETE CBC W/AUTO DIFF WBC: CPT

## 2024-03-15 PROCEDURE — 90715 TDAP VACCINE 7 YRS/> IM: CPT | Performed by: NURSE PRACTITIONER

## 2024-03-15 PROCEDURE — 80061 LIPID PANEL: CPT

## 2024-03-15 PROCEDURE — 80053 COMPREHEN METABOLIC PANEL: CPT

## 2024-03-15 PROCEDURE — 3074F SYST BP LT 130 MM HG: CPT | Performed by: NURSE PRACTITIONER

## 2024-03-15 PROCEDURE — 90472 IMMUNIZATION ADMIN EACH ADD: CPT | Performed by: NURSE PRACTITIONER

## 2024-03-15 PROCEDURE — 3008F BODY MASS INDEX DOCD: CPT | Performed by: NURSE PRACTITIONER

## 2024-03-15 NOTE — PROGRESS NOTES
Wellness Exam    REASON FOR VISIT:    Lise Bartlett is a 34 year old female who presents for an Annual Health Assessment.    Current Complaints: Patient has not needed to go to the hospital for sickle cell pain management since November. Manages at home with Ibuprofen and Norco. States pain is usually in the chest and back.    Patient sees Dr Burr every three months. No longer at [facility] as they no longer see adults with sickle cell.    States generally feeling well. Has intermittent body aches and lingering cough from viral illness last week.    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      Flu shot: see immunization record  Health Maintenance Topics with due status: Overdue       Topic Date Due    Pneumococcal Vaccine: Birth to 64yrs 12/18/2015    DTaP,Tdap,and Td Vaccines 06/24/2019    COVID-19 Vaccine 09/01/2023    Influenza Vaccine 10/01/2023    Annual Depression Screening 01/01/2024    Annual Physical 03/15/2024         CAGE:     Cut: Have you ever felt you should Cut down on your drinking?: No    Annoyed: Have people Annoyed you by criticizing your drinking?: No    Guilty: Have you ever felt bad or Guilty about your drinking?: No    Eye Opener: Have you ever had a drink first thing in the morning to steady your nerves or to get rid of a hangover (Eye opener)?: No    Scoring  Total Score: 0          PREVENTATIVE SERVICES  INDICATIONS AND SCHEDULE Recommendation Internal Lab or Procedure External Lab or Procedure   Breast Cancer Screening   Every 2 yrs age 50-74 No recommendations at this time    Pap Every 3 yrs age 21-65 or Pap and HPV every 5 yrs age 30-65 Health Maintenance   Topic Date Due    Pap Smear  05/15/2025       Chlamydia Screening Screen Annually age<25, if sex active/on OCPs; >24 high risk No results found for: \"CHLAMYDIA\"    Colonoscopy Screen Every 10 years No recommendations at this time    Flex Sigmoidoscopy Screen  Every 5 years No results found for this or any  previous visit.    Fecal Occult Blood  Annually No results found for: \"FOB\", \"OCCULTSTOOL\"    Obesity Screening Screen all adults annually Body mass index is 24.46 kg/m².      Preventive Services for Which Recommendations Vary with Risk Recommendation Internal Lab or Procedure External Lab or Procedure   Cholesterol Screening Recommended screening varies with age, risk and gender No results found for: \"LDL\", \"LDLC\"    Diabetes Screening  if history of high blood pressure or other  risk factors No results found for: \"A1C\"  Glucose (mg/dL)   Date Value   11/06/2023 95         Gonorrhea Screening if high risk No results found for: \"GONOCOCCUS\"    HIV Screening For all adults age 18-65, older adults at increased risk No results found for: \"HIV\"    Syphilis Screening Screen if pregnant or high risk No results found for: \"RPR\"    Hepatitis C Screening Screen those at high risk plus screen one time for adults born 1944-1 965 No results found for: \"HCVAB\"    Tuberculosis Screen if high risk No components found for: \"PPDINDURAT\"      ALLERGIES:     Allergies   Allergen Reactions    [place] Pollen Allergy Skin Test NAUSEA ONLY     Facial swelling and nausea       CURRENT MEDICATIONS:   Current Outpatient Medications   Medication Sig Dispense Refill    folic acid 1 MG Oral Tab Take 1 tablet (1 mg total) by mouth daily. 90 tablet 3    HYDROcodone-acetaminophen 5-325 MG Oral Tab Take 1 tablet by mouth every 6 (six) hours as needed for Pain. Do not drive or operate machinery within 8 hours of taking this medication 120 tablet 0    hydroxyurea 500 MG Oral Cap Take 4 capsules (2,000 mg total) by mouth daily. 360 capsule 3    ibuprofen 600 MG Oral Tab Take 1 tablet (600 mg total) by mouth every 6 (six) hours as needed for Pain. (Patient not taking: Reported on 3/15/2024) 120 tablet 11      MEDICAL INFORMATION:   Past Medical History:   Diagnosis Date    History of pulmonary embolus (PE) 03/2023    Sickle cell anemia (HCC)        Past Surgical History:   Procedure Laterality Date    TONSILLECTOMY        Family History   Problem Relation Age of Onset    Cancer Neg       SOCIAL HISTORY:   Social History     Socioeconomic History    Marital status: Single   Tobacco Use    Smoking status: Never    Smokeless tobacco: Never   Vaping Use    Vaping Use: Never used   Substance and Sexual Activity    Alcohol use: Yes     Comment: occ    Drug use: Yes     Types: Cannabis     Comment: regularly    Sexual activity: Yes     Partners: Male     Birth control/protection: I.U.D.     Comment: Emma   Other Topics Concern    Caffeine Concern No    Exercise Yes     Comment: 3-4x week    Seat Belt Yes     Social Determinants of Health     Financial Resource Strain: Low Risk  (3/6/2023)    Financial Resource Strain     Difficulty of Paying Living Expenses: Not very hard     Med Affordability: No   Transportation Needs: No Transportation Needs (3/6/2023)    Transportation Needs     Lack of Transportation: No          REVIEW OF SYSTEMS:   Constitutional: Negative for fever, chills and fatigue.   HENT: Negative for hearing loss, congestion, sore throat and neck pain. Does c/o post nasal drip.   Eyes: Negative for pain and visual disturbance.   Respiratory: Negative for cough and shortness of breath.    Cardiovascular: Negative for chest pain and palpitations.   Gastrointestinal: Negative for nausea, vomiting, abdominal pain and diarrhea.   Genitourinary: Negative for urgency, frequency and difficulty urinating.   Musculoskeletal: Negative for arthralgias and no gait problem.   Skin: Negative for color change and rash.   Neurological: Negative for tremors, weakness and numbness.   Hematological: Negative for adenopathy. Does not bruise/bleed easily.   Psychiatric/Behavioral: Negative for confusion and agitation. The patient is not nervous/anxious.    EXAM:   /76 (BP Location: Right arm, Patient Position: Sitting)   Pulse 62   Temp 97.8 °F (36.6 °C)  (Temporal)   Resp 18   Ht 6' 0.01\" (1.829 m)   Wt 180 lb 6 oz (81.8 kg)   LMP 03/05/2024   SpO2 98%   BMI 24.46 kg/m²    Patient's last menstrual period was 03/05/2024.     Constitutional: She appears her stated age, nourished, and pleasant. Vital signs reviewed as noted  Head: Normocephalic and atraumatic.   Nose: Nose normal  Eyes: EOM are normal. Pupils are equal, round, and reactive to light. No scleral icterus.   Neck: Normal range of motion. No thyromegaly present.   Cardiovascular: Normal rate, regular rhythm and normal heart sounds.  Exam reveals no friction rub.    No murmur heard.  Pulmonary/Chest: Effort normal and breath sounds normal. She has no wheezes. She has no rales.   Abdominal: Soft. Bowel sounds are normal. There is no tenderness.   Musculoskeletal: Normal range of motion. She exhibits no edema.   Lymphadenopathy:    She has no cervical adenopathy or supraclavicular adenopathy.   Neurological: She is alert and oriented to person, place, and time. She has normal reflexes.   Skin: Skin is warm. No rash noted. No erythema.   Psychiatric: She has a normal mood and affect and her behavior is normal.     ASSESSMENT AND OTHER RELEVANT CHRONIC CONDITIONS:   Lise Bartlett is a 34 year old female who presents for an Annual Health Assessment.   1. Annual physical exam    2. Laboratory examination ordered as part of a routine general medical examination    3. Sickle cell disease without crisis (HCC)        PLAN SUMMARY:   Lise Bartlett is a 34 year old female  Age appropriate cancer screening, labs, safety, immunizations were discussed with the patient and ordered as follows:    Lise was seen today for well adult.    Diagnoses and all orders for this visit:    Annual physical exam    Laboratory examination ordered as part of a routine general medical examination  -     CBC With Differential With Platelet; Future  -     Comp Metabolic Panel (14); Future  -     Lipid Panel; Future  -      TSH W Reflex To Free T4; Future  -     Urinalysis, Routine; Future    Sickle cell disease without crisis (HCC)    Other orders  -     Prevnar 20 (PCV20) [08042]  -     TdaP (Adacel, Boostrix) [20493]        Orders Placed This Encounter   Procedures    CBC With Differential With Platelet    Comp Metabolic Panel (14)    Lipid Panel    TSH W Reflex To Free T4    Urinalysis, Routine    Prevnar 20 (PCV20) [97165]    TdaP (Adacel, Boostrix) [00931]       Imaging & Consults:  PCV20 VACCINE FOR INTRAMUSCULAR USE  TETANUS, DIPHTHERIA TOXOIDS AND ACELLULAR PERTUSIS VACCINE (TDAP), >7 YEARS, IM USE    Her 5 year prevention plan includes: annual visits for fasting labs  Health Maintenance   Topic Date Due    Pneumococcal Vaccine: Birth to 64yrs (2 of 2 - PPSV23 or PCV20) 12/18/2015    DTaP,Tdap,and Td Vaccines (3 - Td or Tdap) 06/24/2019    COVID-19 Vaccine (4 - 2023-24 season) 09/01/2023    Influenza Vaccine (1) 10/01/2023    Annual Depression Screening  01/01/2024    Annual Physical  03/15/2024    Pap Smear  05/15/2025     Patient/Caregiver Education:  Patient/Caregiver Education: There are no barriers to learning. Medical education done.  Outcome: Patient verbalizes understanding.    Educated by: MD     The patient indicates understanding of these issues and agrees to the plan.    SUGGESTED VACCINATIONS - Influenza, Pneumococcal, Zoster, Tetanus     Immunization History   Administered Date(s) Administered    >=3 YRS TRI  MULTIDOSE VIAL (04092) FLU CLINIC 11/05/2010    Covid-19 Vaccine Moderna 100 mcg/0.5 ml 03/19/2021, 04/23/2021    Covid-19 Vaccine Moderna 50 Mcg/0.25 Ml 12/29/2021    FLU VAC QIV SPLIT 3 YRS AND OLDER (54808) 11/20/2013    FLUZONE 6 months and older PFS 0.5 ml (66576) 10/16/2015, 09/23/2016, 09/13/2017, 10/17/2018, 09/06/2019, 10/16/2020, 10/01/2021, 09/16/2022    HPV (Gardasil) 03/12/2008, 03/12/2008    Influenza 10/16/2015    Influenza A, H1N1 Vaccine 11/25/2009    Influenza Virus Vaccine - Whole  10/08/2004    MMR 06/24/2009    Meningococcal-Menactra 06/24/2009, 11/24/2009    Pneumococcal (Prevnar 13) 10/23/2015    Pneumococcal Conjugate PCV20 03/15/2024    TDAP 06/24/2009, 03/15/2024    Td 03/16/2005       Influenza Annually   Pneumococcal if high risk   Td/Tdap once then every 10 years   HPV Females 11-26: 3 doses   Zoster (Shingles) 60 and older: one dose   Varicella 2 doses if not immune   MMR 1-2 doses if born after 1955 and not immune     Patient Active Problem List   Diagnosis    Sickle cell disease without crisis (HCC)    Hb-SS disease with vaso-occlusive crisis (HCC)    IUD (intrauterine device) in place    History of pulmonary embolus (PE)    Annual physical exam    Laboratory examination ordered as part of a routine general medical examination     PREVENTIVE VISIT,EST,18-39

## 2024-03-15 NOTE — PROGRESS NOTES
Patient was seen and examined by me as well as APN student. Agree with assessment and plan.   ANDI Snow

## 2024-03-18 ENCOUNTER — OFFICE VISIT (OUTPATIENT)
Dept: HEMATOLOGY/ONCOLOGY | Facility: HOSPITAL | Age: 34
End: 2024-03-18
Attending: INTERNAL MEDICINE
Payer: COMMERCIAL

## 2024-03-18 VITALS
SYSTOLIC BLOOD PRESSURE: 131 MMHG | RESPIRATION RATE: 16 BRPM | WEIGHT: 184 LBS | TEMPERATURE: 98 F | OXYGEN SATURATION: 100 % | DIASTOLIC BLOOD PRESSURE: 81 MMHG | HEART RATE: 89 BPM | HEIGHT: 72 IN | BODY MASS INDEX: 24.92 KG/M2

## 2024-03-18 DIAGNOSIS — D57.1 SICKLE CELL DISEASE WITHOUT CRISIS (HCC): ICD-10-CM

## 2024-03-18 DIAGNOSIS — D57.00 ANEMIA, SICKLE CELL WITH CRISIS (HCC): ICD-10-CM

## 2024-03-18 DIAGNOSIS — Z86.711 HISTORY OF PULMONARY EMBOLISM: ICD-10-CM

## 2024-03-18 PROCEDURE — 99214 OFFICE O/P EST MOD 30 MIN: CPT | Performed by: INTERNAL MEDICINE

## 2024-03-18 RX ORDER — HYDROCODONE BITARTRATE AND ACETAMINOPHEN 5; 325 MG/1; MG/1
1 TABLET ORAL EVERY 6 HOURS PRN
Qty: 120 TABLET | Refills: 0 | Status: SHIPPED | OUTPATIENT
Start: 2024-03-18

## 2024-03-18 NOTE — PROGRESS NOTES
Cancer Center Report of Consultation    Patient Name: Lise Bartlett   YOB: 1990   Medical Record Number: [id number]   CSN: [id number]   Consulting Physician: Refugio Burr MD  Referring Physician(s): No ref. provider found  Date of Consultation: 2023     Reason for Consultation:  Lise Bartlett was seen today in the Cancer Center for evaluation and management of sickle cell SS disease and history of pulmonary embolism.    History of Present Illness:     She is doing reasonably well. She only takes occasional Norco PRN. She has no new complaints. She has no dyspnea or cough. She has no fever or sweats. She is working. She has no chest pain.    She has a diagnosis of sickle cell anemia SS disease. She has been followed at [facility] [place] [facility].  She has been on hydroxyurea 2000 mg for many years. She had a HGB F of 34% in 2022. She has not had hospitalization for pain for many years. Prior to age 21 she had admissions. She had one admission when she was a child for acute chest syndrome. More recently she has controlled her intermittent pain with Ibuprofen 600 mg PRN and Norce 10/325 2 tabs PRN. She takes the Norce about twice per month. She takes the Ibuprofen more often.     She was admitted to [facility] in 3/2023 with acute chest pain. She had a CTA of the chest that showed acute pulmonary embolism. She was treated with apixaban 5 mg BID. .    Past Medical History:  Past Medical History:   Diagnosis Date    History of pulmonary embolus (PE) 2023    Sickle cell anemia (HCC)        Past Surgical History:  Past Surgical History:   Procedure Laterality Date    TONSILLECTOMY         Family Medical History:  Family History   Problem Relation Age of Onset    Cancer Neg        Gyne History:  OB History    Para Term  AB Living   0 0 0 0 0 0   SAB IAB Ectopic Multiple Live Births   0 0 0 0 0       Psychosocial History:  Social History     Socioeconomic  History    Marital status: Single     Spouse name: Not on file    Number of children: Not on file    Years of education: Not on file    Highest education level: Not on file   Occupational History    Not on file   Tobacco Use    Smoking status: Never    Smokeless tobacco: Never   Vaping Use    Vaping Use: Never used   Substance and Sexual Activity    Alcohol use: Yes     Comment: occ    Drug use: Yes     Types: Cannabis     Comment: regularly    Sexual activity: Yes     Partners: Male     Birth control/protection: I.U.D.     Comment: Emma   Other Topics Concern     Service Not Asked    Blood Transfusions Not Asked    Caffeine Concern No    Occupational Exposure Not Asked    Hobby Hazards Not Asked    Sleep Concern Not Asked    Stress Concern Not Asked    Weight Concern Not Asked    Special Diet Not Asked    Back Care Not Asked    Exercise Yes     Comment: 3-4x week    Bike Helmet Not Asked    Seat Belt Yes    Self-Exams Not Asked   Social History Narrative    Not on file     Social Determinants of Health     Financial Resource Strain: Low Risk  (3/6/2023)    Financial Resource Strain     Difficulty of Paying Living Expenses: Not very hard     Med Affordability: No   Food Insecurity: Not on file   Transportation Needs: No Transportation Needs (3/6/2023)    Transportation Needs     Lack of Transportation: No   Physical Activity: Not on file   Stress: Not on file   Social Connections: Not on file   Housing Stability: Not on file       Allergies:   Allergies   Allergen Reactions    Black [place] Pollen Allergy Skin Test NAUSEA ONLY     Facial swelling and nausea       Current Medications:    Current Outpatient Medications:     folic acid 1 MG Oral Tab, Take 1 tablet (1 mg total) by mouth daily., Disp: 90 tablet, Rfl: 3    HYDROcodone-acetaminophen 5-325 MG Oral Tab, Take 1 tablet by mouth every 6 (six) hours as needed for Pain. Do not drive or operate machinery within 8 hours of taking this medication, Disp:  120 tablet, Rfl: 0    hydroxyurea 500 MG Oral Cap, Take 4 capsules (2,000 mg total) by mouth daily., Disp: 360 capsule, Rfl: 3    ibuprofen 600 MG Oral Tab, Take 1 tablet (600 mg total) by mouth every 6 (six) hours as needed for Pain. (Patient not taking: Reported on 3/15/2024), Disp: 120 tablet, Rfl: 11    Review of Systems:    Constitutional: Negative for anorexia, fatigue, fevers, chills, night sweats and weight loss.  Eyes: Negative for visual disturbance, irritation and redness.  Respiratory: Negative for cough, hemoptysis, chest pain, or dyspnea.  Cardiovascular: Negative for angina, orthopnea or palpitations.  Gastrointestinal: Negative for nausea, vomiting, change in bowel habits, diarrhea, constipation and abdominal pain.  Integument/breast: Negative for rash, skin lesions, and pruritus.  Hematologic/lymphatic: Negative for easy bruising, bleeding, and lymphadenopathy.  Genitourinary: Negative for dysuria or hematuria    Psychiatric: The patient's mood was calm and appropriate for this visit.    The pertinent positives and negatives were described in the HPI and above. All other systems were negative.      Vital Signs:  Height: 182.9 cm (6' 0.01\") (03/18 1529)  Weight: 83.5 kg (184 lb) (03/18 1529)  BSA (Calculated - sq m): 2.06 sq meters (03/18 1529)  Pulse: 89 (03/18 1529)  BP: 131/81 (03/18 1529)  Temp: 97.6 °F (36.4 °C) (03/18 1529)  Do Not Use - Resp Rate: --  SpO2: 100 % (03/18 1529)    Physical Examination:    Constitutional: Patient is alert and oriented x 3, not in acute distress.  Eyes: Anicteric sclera. Pink conjunctiva.  Respiratory: Clear to auscultation and percussion. No rales.  No wheezes.  Cardiovascular: Regular rate and rhythm.   Gastrointestinal: Soft, non tender with good bowel sounds.  Extremities: No edema. No calf tenderness.  Lymphatics: There is no palpable lymphadenopathy throughout in the cervical, supraclavicular, or axillary regions.    Labs reviewed at this visit:  Lab Results    Component Value Date    WBC 3.1 (L) 03/15/2024    RBC 1.85 (L) 03/15/2024    HGB 8.4 (L) 03/15/2024    HCT 23.6 (L) 03/15/2024    .6 (H) 03/15/2024    MCH 45.4 (H) 03/15/2024    MCHC 35.6 03/15/2024    RDW 14.5 03/15/2024    .0 (H) 03/15/2024     Lab Results   Component Value Date     03/15/2024    K 4.3 03/15/2024     (H) 03/15/2024    CO2 21.0 03/15/2024    BUN 7 (L) 03/15/2024    CREATSERUM 0.46 (L) 03/15/2024    GLU 81 03/15/2024    CA 8.8 03/15/2024    ALKPHO 67 03/15/2024    ALT 28 03/15/2024    AST 41 (H) 03/15/2024    BILT 0.6 03/15/2024    ALB 3.9 03/15/2024    TP 8.4 (H) 03/15/2024        Radiologic imaging reviewed at this visit:    CTA Chest on 3/2/2023:  FINDINGS:    LUNGS:  A pleural-based area of opacity in the anterolateral aspect of the right lung base measuring up to 2.7 x 1.4 cm could represent a pulmonary infarct, with an area of rounded atelectasis, pulmonary mass, or other etiologies not entirely excluded.    Clinical correlation recommended along with follow-up to confirm stability/resolution.  PET-CT may also be of further value.  Pleural-based noncalcified pulmonary nodule along the right major fissure on image 138 series 5 measuring up to 6 mm.  Minimal  atelectasis/scarring elsewhere in the lungs.  VASCULATURE:    There are acute pulmonary emboli noted within the segmental and subsegmental arterial branches of the right lower lobe.  There is an overall mild embolic burden.  THORACIC AORTA:    No aortic aneurysm or acute dissection.  MEDIASTINUM/KATHERINE:    Residual thymus the anterior mediastinum.  CARDIAC:    Unremarkable  PLEURA:    Trace right pleural effusion.  CHEST WALL:    Unremarkable  LIMITED ABDOMEN:    Unremarkable  BONES:    Mild degenerative changes in the spine with minimal rightward curvature.  OTHER:    None     Impression   CONCLUSION:       1. There are acute pulmonary emboli noted within the segmental and subsegmental arterial branches of the  right lower lobe.  There is an overall mild embolic burden.     2. A pleural-based area of opacity in the anterolateral aspect of the right lung base measuring up to 2.7 x 1.4 cm could represent a pulmonary infarct, with an area of rounded atelectasis, pulmonary mass, or other etiologies not entirely excluded.    Clinical correlation recommended along with follow-up to confirm stability/resolution.  PET-CT may also be of further value.     3. 6 mm noncalcified pulmonary nodule along the right major fissure. Followup as per Fleischner criteria is suggested.       Assessment/Plan:    1  Sickle cell SS disease:  Hydroxyurea 2000 mg daily  Marked HGB F response at 34%    She is doing well with the current management. Continue folic acid 1 mg daily. She has chronic anemia likely from the chronic hemolytic anemia. I will follow her at three month intervals with labs. She will continue the hydroxyurea at the current dosing.    2  Intermittent sickle cell pain crises:    She is managing this with ibuprofen and Norco PRN. She will continue with the current management. We will refill these as needed.    3  Pulmonary embolism diagnosed in 3/2023   Diagnosed while on estrogen birth control    She is no longer on OCP and she has stopped the anticoagulation.    I will plan to see her at three month intervals with labs. She will call in between these visits if she has any concern.      Refugio Burr MD

## 2024-03-18 NOTE — PROGRESS NOTES
Patient is here for follow up for sickle cell disease. She is taking hydrea 4 capsules daily.    She has pain in her back and chest.  She has been able to manage crisis pain without hospitalization.     Education Record    Learner:  Patient    Disease / Diagnosis: sickle cell disease    Barriers / Limitations:  None   Comments:    Method:  Discussion   Comments:    General Topics:  Side effects and symptom management   Comments:    Outcome:  Shows understanding   Comments:

## 2024-03-19 ENCOUNTER — TELEPHONE (OUTPATIENT)
Dept: HEMATOLOGY/ONCOLOGY | Facility: HOSPITAL | Age: 34
End: 2024-03-19

## 2024-03-19 NOTE — TELEPHONE ENCOUNTER
Lise [phone]  I called my insurance about  my medication they said that my Dr. Burr needs to give the a call [employer] [phone] and speak with them to release my script  HYDROcodone-acetaminophen 5-325 MG Oral Tab.  So that I could get this filled. Thanks Rose

## 2024-05-28 ENCOUNTER — TELEPHONE (OUTPATIENT)
Dept: HEMATOLOGY/ONCOLOGY | Facility: HOSPITAL | Age: 34
End: 2024-05-28

## 2024-05-28 DIAGNOSIS — D57.1 SICKLE CELL DISEASE WITHOUT CRISIS (HCC): ICD-10-CM

## 2024-05-28 RX ORDER — HYDROXYUREA 500 MG/1
2000 CAPSULE ORAL DAILY
Qty: 360 CAPSULE | Refills: 3 | Status: SHIPPED | OUTPATIENT
Start: 2024-05-28 | End: 2025-05-23

## 2024-05-28 NOTE — TELEPHONE ENCOUNTER
Pt is calling to get a refill on Hydroxyurea sent to [facility] in [facility]

## 2024-06-03 DIAGNOSIS — D57.1 SICKLE CELL DISEASE WITHOUT CRISIS (HCC): ICD-10-CM

## 2024-06-03 DIAGNOSIS — D57.00 ANEMIA, SICKLE CELL WITH CRISIS (HCC): ICD-10-CM

## 2024-06-03 DIAGNOSIS — Z86.711 HISTORY OF PULMONARY EMBOLISM: ICD-10-CM

## 2024-06-03 RX ORDER — IBUPROFEN 600 MG/1
600 TABLET ORAL EVERY 6 HOURS PRN
Qty: 120 TABLET | Refills: 11 | Status: SHIPPED | OUTPATIENT
Start: 2024-06-03

## 2024-06-03 RX ORDER — HYDROCODONE BITARTRATE AND ACETAMINOPHEN 5; 325 MG/1; MG/1
1 TABLET ORAL EVERY 6 HOURS PRN
Qty: 120 TABLET | Refills: 0 | Status: SHIPPED | OUTPATIENT
Start: 2024-06-03

## 2024-06-17 ENCOUNTER — OFFICE VISIT (OUTPATIENT)
Dept: HEMATOLOGY/ONCOLOGY | Facility: HOSPITAL | Age: 34
End: 2024-06-17
Attending: INTERNAL MEDICINE
Payer: COMMERCIAL

## 2024-06-17 VITALS
OXYGEN SATURATION: 100 % | DIASTOLIC BLOOD PRESSURE: 65 MMHG | HEIGHT: 72 IN | SYSTOLIC BLOOD PRESSURE: 103 MMHG | BODY MASS INDEX: 24.58 KG/M2 | HEART RATE: 82 BPM | TEMPERATURE: 98 F | RESPIRATION RATE: 16 BRPM | WEIGHT: 181.5 LBS

## 2024-06-17 DIAGNOSIS — D57.1 SICKLE CELL DISEASE WITHOUT CRISIS (HCC): ICD-10-CM

## 2024-06-17 DIAGNOSIS — D57.00 ANEMIA, SICKLE CELL WITH CRISIS (HCC): ICD-10-CM

## 2024-06-17 DIAGNOSIS — Z86.711 HISTORY OF PULMONARY EMBOLISM: ICD-10-CM

## 2024-06-17 LAB
ALBUMIN SERPL-MCNC: 3.5 G/DL (ref 3.4–5)
ALBUMIN/GLOB SERPL: 0.8 {RATIO} (ref 1–2)
ALP LIVER SERPL-CCNC: 59 U/L
ALT SERPL-CCNC: 36 U/L
ANION GAP SERPL CALC-SCNC: 4 MMOL/L (ref 0–18)
AST SERPL-CCNC: 37 U/L (ref 15–37)
BASOPHILS # BLD AUTO: 0.03 X10(3) UL (ref 0–0.2)
BASOPHILS NFR BLD AUTO: 0.8 %
BILIRUB SERPL-MCNC: 0.7 MG/DL (ref 0.1–2)
BUN BLD-MCNC: 5 MG/DL (ref 9–23)
CALCIUM BLD-MCNC: 8.2 MG/DL (ref 8.5–10.1)
CHLORIDE SERPL-SCNC: 113 MMOL/L (ref 98–112)
CO2 SERPL-SCNC: 24 MMOL/L (ref 21–32)
CREAT BLD-MCNC: 0.53 MG/DL
EGFRCR SERPLBLD CKD-EPI 2021: 125 ML/MIN/1.73M2 (ref 60–?)
EOSINOPHIL # BLD AUTO: 0.02 X10(3) UL (ref 0–0.7)
EOSINOPHIL NFR BLD AUTO: 0.5 %
ERYTHROCYTE [DISTWIDTH] IN BLOOD BY AUTOMATED COUNT: 13.8 %
GLOBULIN PLAS-MCNC: 4.2 G/DL (ref 2.8–4.4)
GLUCOSE BLD-MCNC: 103 MG/DL (ref 70–99)
HCT VFR BLD AUTO: 21 %
HGB BLD-MCNC: 7.9 G/DL
IMM GRANULOCYTES # BLD AUTO: 0.03 X10(3) UL (ref 0–1)
IMM GRANULOCYTES NFR BLD: 0.8 %
LYMPHOCYTES # BLD AUTO: 2.82 X10(3) UL (ref 1–4)
LYMPHOCYTES NFR BLD AUTO: 70.7 %
MCH RBC QN AUTO: 44.4 PG (ref 26–34)
MCHC RBC AUTO-ENTMCNC: 37.6 G/DL (ref 31–37)
MCV RBC AUTO: 118 FL
MONOCYTES # BLD AUTO: 0.16 X10(3) UL (ref 0.1–1)
MONOCYTES NFR BLD AUTO: 4 %
NEUTROPHILS # BLD AUTO: 0.93 X10 (3) UL (ref 1.5–7.7)
NEUTROPHILS # BLD AUTO: 0.93 X10(3) UL (ref 1.5–7.7)
NEUTROPHILS NFR BLD AUTO: 23.2 %
OSMOLALITY SERPL CALC.SUM OF ELEC: 290 MOSM/KG (ref 275–295)
PLATELET # BLD AUTO: 399 10(3)UL (ref 150–450)
PLATELETS.RETICULATED NFR BLD AUTO: 0.8 % (ref 0–7)
POTASSIUM SERPL-SCNC: 3.5 MMOL/L (ref 3.5–5.1)
PROT SERPL-MCNC: 7.7 G/DL (ref 6.4–8.2)
RBC # BLD AUTO: 1.78 X10(6)UL
SODIUM SERPL-SCNC: 141 MMOL/L (ref 136–145)
WBC # BLD AUTO: 4 X10(3) UL (ref 4–11)

## 2024-06-17 PROCEDURE — 99213 OFFICE O/P EST LOW 20 MIN: CPT | Performed by: INTERNAL MEDICINE

## 2024-06-17 NOTE — PROGRESS NOTES
Cancer Center Report of Consultation    Patient Name: Lise Bartlett   YOB: 1990   Medical Record Number: [id number]   CSN: [id number]   Consulting Physician: Refugio Burr MD  Referring Physician(s): No ref. provider found  Date of Consultation: 2023     Reason for Consultation:  Lise Bartlett was seen today in the Cancer Center for evaluation and management of sickle cell SS disease and history of pulmonary embolism.    History of Present Illness:     She is doing reasonably well. She has some increased back pain at this visit. She took some Ibuprofen now but plans to take Norco when she gets home. She only takes occasional Norco PRN. She has no new complaints. She has no dyspnea or cough. She has no fever or sweats. She is working. She has no chest pain.    She has a diagnosis of sickle cell anemia SS disease. She has been followed at [facility].  She has been on hydroxyurea 2000 mg for many years. She had a HGB F of 34% in 2022. She has not had hospitalization for pain for many years. Prior to age 21 she had admissions. She had one admission when she was a child for acute chest syndrome. More recently she has controlled her intermittent pain with Ibuprofen 600 mg PRN and Norce 10/325 2 tabs PRN. She takes the Norce about twice per month. She takes the Ibuprofen more often.     She was admitted to [facility] in 3/2023 with acute chest pain. She had a CTA of the chest that showed acute pulmonary embolism. She was treated with apixaban 5 mg BID. .    Past Medical History:  Past Medical History:    History of pulmonary embolus (PE)    Sickle cell anemia (HCC)       Past Surgical History:  Past Surgical History:   Procedure Laterality Date    Tonsillectomy         Family Medical History:  Family History   Problem Relation Age of Onset    Cancer Neg        Gyne History:  OB History    Para Term  AB Living   0 0 0 0 0 0   SAB IAB Ectopic  Multiple Live Births   0 0 0 0 0       Psychosocial History:  Social History     Socioeconomic History    Marital status: Single     Spouse name: Not on file    Number of children: Not on file    Years of education: Not on file    Highest education level: Not on file   Occupational History    Not on file   Tobacco Use    Smoking status: Never    Smokeless tobacco: Never   Vaping Use    Vaping status: Never Used   Substance and Sexual Activity    Alcohol use: Yes     Comment: occ    Drug use: Yes     Types: Cannabis     Comment: regularly    Sexual activity: Yes     Partners: Male     Birth control/protection: I.U.D.     Comment: Emma   Other Topics Concern     Service Not Asked    Blood Transfusions Not Asked    Caffeine Concern No    Occupational Exposure Not Asked    Hobby Hazards Not Asked    Sleep Concern Not Asked    Stress Concern Not Asked    Weight Concern Not Asked    Special Diet Not Asked    Back Care Not Asked    Exercise Yes     Comment: 3-4x week    Bike Helmet Not Asked    Seat Belt Yes    Self-Exams Not Asked   Social History Narrative    Not on file     Social Determinants of Health     Financial Resource Strain: Low Risk  (3/6/2023)    Financial Resource Strain     Difficulty of Paying Living Expenses: Not very hard     Med Affordability: No   Food Insecurity: Not on file   Transportation Needs: No Transportation Needs (3/6/2023)    Transportation Needs     Lack of Transportation: No   Physical Activity: Not on file   Stress: Not on file   Social Connections: Unknown (3/18/2021)    [facility]    Social Connections     Conversations with friends/family/neighbors per week: Not on file   Housing Stability: Low Risk  (7/6/2021)    [facility]    Housing Stability     Mortgage Payment Concerns?: Not on file     Number of Places Lived in the Last Year: Not on file      Unstable Housing?: Not on file       Allergies:   Allergies   Allergen Reactions    Black [place] Pollen Allergy Skin Test NAUSEA ONLY     Facial swelling and nausea       Current Medications:    Current Outpatient Medications:     HYDROcodone-acetaminophen 5-325 MG Oral Tab, Take 1 tablet by mouth every 6 (six) hours as needed for Pain. Do not drive or operate machinery within 8 hours of taking this medication, Disp: 120 tablet, Rfl: 0    ibuprofen 600 MG Oral Tab, Take 1 tablet (600 mg total) by mouth every 6 (six) hours as needed for Pain., Disp: 120 tablet, Rfl: 11    hydroxyurea 500 MG Oral Cap, Take 4 capsules (2,000 mg total) by mouth daily., Disp: 360 capsule, Rfl: 3    folic acid 1 MG Oral Tab, Take 1 tablet (1 mg total) by mouth daily., Disp: 90 tablet, Rfl: 3    Review of Systems:    Constitutional: Negative for anorexia, fatigue, fevers, chills, night sweats and weight loss.  Eyes: Negative for visual disturbance, irritation and redness.  Respiratory: Negative for cough, hemoptysis, chest pain, or dyspnea.  Cardiovascular: Negative for angina, orthopnea or palpitations.  Gastrointestinal: Negative for nausea, vomiting, change in bowel habits, diarrhea, constipation and abdominal pain.  Integument/breast: Negative for rash, skin lesions, and pruritus.  Hematologic/lymphatic: Negative for easy bruising, bleeding, and lymphadenopathy.  Genitourinary: Negative for dysuria or hematuria    Psychiatric: The patient's mood was calm and appropriate for this visit.    The pertinent positives and negatives were described in the HPI and above. All other systems were negative.      Vital Signs:  Height: 182.9 cm (6' 0.01\") (06/17 1512)  Weight: 82.3 kg (181 lb 8 oz) (06/17 1512)  BSA (Calculated - sq m): 2.04 sq meters (06/17 1512)  Pulse: 82 (06/17 1512)  BP: 103/65 (06/17 1512)  Temp: 98.2 °F (36.8 °C) (06/17 1512)  Do Not Use - Resp Rate: --  SpO2: 100 % (06/17 1512)    Physical Examination:    Constitutional:  Patient is alert and oriented x 3, not in acute distress.  Eyes: Anicteric sclera. Pink conjunctiva.  Respiratory: Clear to auscultation and percussion. No rales.  No wheezes.  Cardiovascular: Regular rate and rhythm.   Gastrointestinal: Soft, non tender with good bowel sounds.  Extremities: No edema. No calf tenderness.  Lymphatics: There is no palpable lymphadenopathy throughout in the cervical, supraclavicular, or axillary regions.    Labs reviewed at this visit:  Lab Results   Component Value Date    WBC 4.0 06/17/2024    RBC 1.78 (L) 06/17/2024    HGB 7.9 (L) 06/17/2024    HCT 21.0 (L) 06/17/2024    .0 (H) 06/17/2024    MCH 44.4 (H) 06/17/2024    MCHC 37.6 (H) 06/17/2024    RDW 13.8 06/17/2024    .0 06/17/2024     Lab Results   Component Value Date     06/17/2024    K 3.5 06/17/2024     (H) 06/17/2024    CO2 24.0 06/17/2024    BUN 5 (L) 06/17/2024    CREATSERUM 0.53 (L) 06/17/2024     (H) 06/17/2024    CA 8.2 (L) 06/17/2024    ALKPHO 59 06/17/2024    ALT 36 06/17/2024    AST 37 06/17/2024    BILT 0.7 06/17/2024    ALB 3.5 06/17/2024    TP 7.7 06/17/2024        Radiologic imaging reviewed at this visit:    CTA Chest on 3/2/2023:  FINDINGS:    LUNGS:  A pleural-based area of opacity in the anterolateral aspect of the right lung base measuring up to 2.7 x 1.4 cm could represent a pulmonary infarct, with an area of rounded atelectasis, pulmonary mass, or other etiologies not entirely excluded.    Clinical correlation recommended along with follow-up to confirm stability/resolution.  PET-CT may also be of further value.  Pleural-based noncalcified pulmonary nodule along the right major fissure on image 138 series 5 measuring up to 6 mm.  Minimal  atelectasis/scarring elsewhere in the lungs.  VASCULATURE:    There are acute pulmonary emboli noted within the segmental and subsegmental arterial branches of the right lower lobe.  There is an overall mild embolic burden.  THORACIC AORTA:     No aortic aneurysm or acute dissection.  MEDIASTINUM/KATHERINE:    Residual thymus the anterior mediastinum.  CARDIAC:    Unremarkable  PLEURA:    Trace right pleural effusion.  CHEST WALL:    Unremarkable  LIMITED ABDOMEN:    Unremarkable  BONES:    Mild degenerative changes in the spine with minimal rightward curvature.  OTHER:    None     Impression   CONCLUSION:       1. There are acute pulmonary emboli noted within the segmental and subsegmental arterial branches of the right lower lobe.  There is an overall mild embolic burden.     2. A pleural-based area of opacity in the anterolateral aspect of the right lung base measuring up to 2.7 x 1.4 cm could represent a pulmonary infarct, with an area of rounded atelectasis, pulmonary mass, or other etiologies not entirely excluded.    Clinical correlation recommended along with follow-up to confirm stability/resolution.  PET-CT may also be of further value.     3. 6 mm noncalcified pulmonary nodule along the right major fissure. Followup as per Fleischner criteria is suggested.       Assessment/Plan:    1  Sickle cell SS disease:  Hydroxyurea 2000 mg daily  Marked HGB F response at 34%    She is doing well with the current management. Continue folic acid 1 mg daily. She has chronic anemia likely from the chronic hemolytic anemia. I will follow her at three month intervals with labs. She will continue the hydroxyurea at the current dosing.    I discussed the option of cellular gene therapy. We will hold off until next visit but will then consider consultation.    2  Intermittent sickle cell pain crises:    She is managing this with ibuprofen and Norco PRN. She will continue with the current management. We will refill these as needed.    3  Pulmonary embolism diagnosed in 3/2023   Diagnosed while on estrogen birth control    She is no longer on OCP and she has stopped the anticoagulation.    I will plan to see her at three month intervals with labs. She will call in  between these visits if she has any concern.      Refugio Burr MD

## 2024-06-17 NOTE — PROGRESS NOTES
Patient here for 3 month f/u for sickle cell. Patient taking 2000 mg hydroxyurea as directed with no issues. Patient states she has lower back pain that she rates a 5/10 she took motrin that didn't relieve pain. Patient denies dyspnea or chest pain. Patient states she has good appetite and adequate fluid intake.    Education Record    Learner:  Patient    Disease / Diagnosis: sickle cell anemia     Barriers / Limitations:  None   Comments:    Method:  Discussion   Comments:    General Topics:  Medication, Side effects and symptom management, and Plan of care reviewed   Comments:    Outcome:  Shows understanding   Comments:

## 2024-08-28 DIAGNOSIS — D57.00 ANEMIA, SICKLE CELL WITH CRISIS (HCC): ICD-10-CM

## 2024-08-28 DIAGNOSIS — D57.1 SICKLE CELL DISEASE WITHOUT CRISIS (HCC): ICD-10-CM

## 2024-08-28 DIAGNOSIS — Z86.711 HISTORY OF PULMONARY EMBOLISM: ICD-10-CM

## 2024-08-28 NOTE — TELEPHONE ENCOUNTER
Patient is calling requesting a refill on folic acid 1 MG Oral Tab and HYDROcodone-acetaminophen 5-325 MG Oral Tab that Dr. Burr has prescribed patient. Patient states she has 5 tablets left of the folic acid 1 MG Oral Tab and 2 tablets left of the HYDROcodone-acetaminophen 5-325 MG Oral Tab. Patient's pharmacy is [place] [address] [place] [facility] [address] [address]. Patient is reachable at [phone]. Called 8/28/24 GA

## 2024-08-29 RX ORDER — FOLIC ACID 1 MG/1
1 TABLET ORAL DAILY
Qty: 90 TABLET | Refills: 3 | Status: SHIPPED | OUTPATIENT
Start: 2024-08-29

## 2024-08-29 RX ORDER — HYDROCODONE BITARTRATE AND ACETAMINOPHEN 5; 325 MG/1; MG/1
1 TABLET ORAL EVERY 6 HOURS PRN
Qty: 120 TABLET | Refills: 0 | Status: SHIPPED | OUTPATIENT
Start: 2024-08-29

## 2024-08-30 ENCOUNTER — TELEPHONE (OUTPATIENT)
Dept: HEMATOLOGY/ONCOLOGY | Facility: HOSPITAL | Age: 34
End: 2024-08-30

## 2024-08-30 DIAGNOSIS — D57.1 SICKLE CELL DISEASE WITHOUT CRISIS (HCC): Primary | ICD-10-CM

## 2024-08-30 RX ORDER — HYDROCODONE BITARTRATE AND ACETAMINOPHEN 10; 325 MG/1; MG/1
0.5 TABLET ORAL EVERY 4 HOURS PRN
Qty: 60 TABLET | Refills: 0 | Status: SHIPPED | OUTPATIENT
Start: 2024-08-30

## 2024-08-30 NOTE — TELEPHONE ENCOUNTER
Patient is calling to make Dr. Burr aware that the pharmacy called her to let her know the dosage for HYDROcodone-acetaminophen 5-325 MG Oral Tab that Dr. Burr has prescribed patient is on back order everywhere. Patient is asking if Dr. Burr can change dosage to 10-325mg. Patient's pharmacy is [place] [address] [place], [place] - [address]. Patient is reachable at [phone]. Called 8/30/24 GA

## 2024-08-30 NOTE — TELEPHONE ENCOUNTER
Called patient to let her know her norco prescription was sent to the [facility] pharmacy in [place]. Patient conveyed understanding.

## 2024-09-16 ENCOUNTER — OFFICE VISIT (OUTPATIENT)
Dept: HEMATOLOGY/ONCOLOGY | Facility: HOSPITAL | Age: 34
End: 2024-09-16
Attending: INTERNAL MEDICINE
Payer: COMMERCIAL

## 2024-09-16 VITALS
HEART RATE: 65 BPM | WEIGHT: 185 LBS | BODY MASS INDEX: 25 KG/M2 | DIASTOLIC BLOOD PRESSURE: 70 MMHG | OXYGEN SATURATION: 100 % | TEMPERATURE: 98 F | SYSTOLIC BLOOD PRESSURE: 105 MMHG

## 2024-09-16 DIAGNOSIS — D57.1 SICKLE CELL DISEASE WITHOUT CRISIS (HCC): ICD-10-CM

## 2024-09-16 DIAGNOSIS — D57.00 ANEMIA, SICKLE CELL WITH CRISIS (HCC): ICD-10-CM

## 2024-09-16 DIAGNOSIS — Z86.711 HISTORY OF PULMONARY EMBOLISM: ICD-10-CM

## 2024-09-16 LAB
ALBUMIN SERPL-MCNC: 4.3 G/DL (ref 3.2–4.8)
ALBUMIN/GLOB SERPL: 1 {RATIO} (ref 1–2)
ALP LIVER SERPL-CCNC: 66 U/L
ALT SERPL-CCNC: 36 U/L
ANION GAP SERPL CALC-SCNC: 3 MMOL/L (ref 0–18)
AST SERPL-CCNC: 27 U/L (ref ?–34)
BASOPHILS # BLD AUTO: 0.01 X10(3) UL (ref 0–0.2)
BASOPHILS NFR BLD AUTO: 0.2 %
BILIRUB SERPL-MCNC: 0.6 MG/DL (ref 0.3–1.2)
BUN BLD-MCNC: 7 MG/DL (ref 9–23)
CALCIUM BLD-MCNC: 9.5 MG/DL (ref 8.7–10.4)
CHLORIDE SERPL-SCNC: 110 MMOL/L (ref 98–112)
CO2 SERPL-SCNC: 24 MMOL/L (ref 21–32)
CREAT BLD-MCNC: 0.54 MG/DL
EGFRCR SERPLBLD CKD-EPI 2021: 125 ML/MIN/1.73M2 (ref 60–?)
EOSINOPHIL # BLD AUTO: 0.01 X10(3) UL (ref 0–0.7)
EOSINOPHIL NFR BLD AUTO: 0.2 %
ERYTHROCYTE [DISTWIDTH] IN BLOOD BY AUTOMATED COUNT: 13.7 %
GLOBULIN PLAS-MCNC: 4.1 G/DL (ref 2–3.5)
GLUCOSE BLD-MCNC: 84 MG/DL (ref 70–99)
HCT VFR BLD AUTO: 21.9 %
HGB BLD-MCNC: 8.2 G/DL
IMM GRANULOCYTES # BLD AUTO: 0.01 X10(3) UL (ref 0–1)
IMM GRANULOCYTES NFR BLD: 0.2 %
LDH SERPL L TO P-CCNC: 278 U/L
LYMPHOCYTES # BLD AUTO: 2.88 X10(3) UL (ref 1–4)
LYMPHOCYTES NFR BLD AUTO: 63.4 %
MCH RBC QN AUTO: 44.6 PG (ref 26–34)
MCHC RBC AUTO-ENTMCNC: 37.4 G/DL (ref 31–37)
MCV RBC AUTO: 119 FL
MONOCYTES # BLD AUTO: 0.13 X10(3) UL (ref 0.1–1)
MONOCYTES NFR BLD AUTO: 2.9 %
NEUTROPHILS # BLD AUTO: 1.5 X10 (3) UL (ref 1.5–7.7)
NEUTROPHILS # BLD AUTO: 1.5 X10(3) UL (ref 1.5–7.7)
NEUTROPHILS NFR BLD AUTO: 33.1 %
OSMOLALITY SERPL CALC.SUM OF ELEC: 281 MOSM/KG (ref 275–295)
PLATELET # BLD AUTO: 442 10(3)UL (ref 150–450)
PLATELETS.RETICULATED NFR BLD AUTO: 2.1 % (ref 0–7)
POTASSIUM SERPL-SCNC: 3.9 MMOL/L (ref 3.5–5.1)
PROT SERPL-MCNC: 8.4 G/DL (ref 5.7–8.2)
RBC # BLD AUTO: 1.84 X10(6)UL
SODIUM SERPL-SCNC: 137 MMOL/L (ref 136–145)
WBC # BLD AUTO: 4.5 X10(3) UL (ref 4–11)

## 2024-09-16 PROCEDURE — 99213 OFFICE O/P EST LOW 20 MIN: CPT | Performed by: INTERNAL MEDICINE

## 2024-09-16 NOTE — PROGRESS NOTES
Patient here for 3 month f/u for SCD. Patient states she has lower back pain that 4/10. Patient states she has a headache. Patient denies chest pain or dyspnea.     Education Record    Learner:  Patient    Disease / Diagnosis: SCD     Barriers / Limitations:  None   Comments:    Method:  Discussion   Comments:    General Topics:  Medication, Side effects and symptom management, and Plan of care reviewed   Comments:    Outcome:  Shows understanding   Comments:

## 2024-09-16 NOTE — PROGRESS NOTES
Cancer Center Report of Consultation    Patient Name: Lise Bartlett   YOB: 1990   Medical Record Number: [id number]   CSN: [id number]   Consulting Physician: Refugio Burr MD  Referring Physician(s): No ref. provider found  Date of Consultation: 2023     Reason for Consultation:  Lise Bartlett was seen today in the Cancer Center for evaluation and management of sickle cell SS disease and history of pulmonary embolism.    History of Present Illness:     She is doing reasonably well. She overall has good control of her pain. She has had a new job working for the [employer]. She feels good about this and enjoys this work. She only takes occasional Norco PRN. She has no new complaints. She has no dyspnea or cough. She has no fever or sweats. She is working. She has no chest pain.    She has a diagnosis of sickle cell anemia SS disease. She has been followed at [facility].  She has been on hydroxyurea 2000 mg for many years. She had a HGB F of 34% in 2022. She has not had hospitalization for pain for many years. Prior to age 21 she had admissions. She had one admission when she was a child for acute chest syndrome. More recently she has controlled her intermittent pain with Ibuprofen 600 mg PRN and Norce 10/325 2 tabs PRN. She takes the Norce about twice per month. She takes the Ibuprofen more often.     She was admitted to [facility] in 3/2023 with acute chest pain. She had a CTA of the chest that showed acute pulmonary embolism. She was treated with apixaban 5 mg BID. .    Past Medical History:  Past Medical History:    History of pulmonary embolus (PE)    Sickle cell anemia (HCC)       Past Surgical History:  Past Surgical History:   Procedure Laterality Date    Tonsillectomy         Family Medical History:  Family History   Problem Relation Age of Onset    Cancer Neg        Gyne History:  OB History    Para Term  AB Living   0 0 0 0 0 0    SAB IAB Ectopic Multiple Live Births   0 0 0 0 0       Psychosocial History:  Social History     Socioeconomic History    Marital status: Single     Spouse name: Not on file    Number of children: Not on file    Years of education: Not on file    Highest education level: Not on file   Occupational History    Not on file   Tobacco Use    Smoking status: Never    Smokeless tobacco: Never   Vaping Use    Vaping status: Never Used   Substance and Sexual Activity    Alcohol use: Yes     Comment: occ    Drug use: Yes     Types: Cannabis     Comment: regularly    Sexual activity: Yes     Partners: Male     Birth control/protection: I.U.D.     Comment: Emma   Other Topics Concern     Service Not Asked    Blood Transfusions Not Asked    Caffeine Concern No    Occupational Exposure Not Asked    Hobby Hazards Not Asked    Sleep Concern Not Asked    Stress Concern Not Asked    Weight Concern Not Asked    Special Diet Not Asked    Back Care Not Asked    Exercise Yes     Comment: 3-4x week    Bike Helmet Not Asked    Seat Belt Yes    Self-Exams Not Asked   Social History Narrative    Not on file     Social Determinants of Health     Financial Resource Strain: Low Risk  (3/6/2023)    Financial Resource Strain     Difficulty of Paying Living Expenses: Not very hard     Med Affordability: No   Food Insecurity: Not on file   Transportation Needs: No Transportation Needs (3/6/2023)    Transportation Needs     Lack of Transportation: No   Physical Activity: Not on file   Stress: Not on file   Social Connections: Unknown (3/18/2021)    [facility]    Social Connections     Conversations with friends/family/neighbors per week: Not on file   Housing Stability: Low Risk  (7/6/2021)    Received from [facility], [facility]    Housing Stability     Mortgage Payment Concerns?: Not on file     Number of Places Lived in the Last  Year: Not on file     Unstable Housing?: Not on file       Allergies:   Allergies   Allergen Reactions    [place]en Allergy Skin Test NAUSEA ONLY     Facial swelling and nausea       Current Medications:    Current Outpatient Medications:     HYDROcodone-acetaminophen  MG Oral Tab, Take 0.5 tablets by mouth every 4 (four) hours as needed for Pain., Disp: 60 tablet, Rfl: 0    HYDROcodone-acetaminophen 5-325 MG Oral Tab, Take 1 tablet by mouth every 6 (six) hours as needed for Pain. Do not drive or operate machinery within 8 hours of taking this medication, Disp: 120 tablet, Rfl: 0    folic acid 1 MG Oral Tab, Take 1 tablet (1 mg total) by mouth daily., Disp: 90 tablet, Rfl: 3    ibuprofen 600 MG Oral Tab, Take 1 tablet (600 mg total) by mouth every 6 (six) hours as needed for Pain., Disp: 120 tablet, Rfl: 11    hydroxyurea 500 MG Oral Cap, Take 4 capsules (2,000 mg total) by mouth daily., Disp: 360 capsule, Rfl: 3    Review of Systems:    Constitutional: Negative for anorexia, fatigue, fevers, chills, night sweats and weight loss.  Eyes: Negative for visual disturbance, irritation and redness.  Respiratory: Negative for cough, hemoptysis, chest pain, or dyspnea.  Cardiovascular: Negative for angina, orthopnea or palpitations.  Gastrointestinal: Negative for nausea, vomiting, change in bowel habits, diarrhea, constipation and abdominal pain.  Integument/breast: Negative for rash, skin lesions, and pruritus.  Hematologic/lymphatic: Negative for easy bruising, bleeding, and lymphadenopathy.  Genitourinary: Negative for dysuria or hematuria    Psychiatric: The patient's mood was calm and appropriate for this visit.    The pertinent positives and negatives were described in the HPI and above. All other systems were negative.      Vital Signs:  Height: --  Weight: 83.9 kg (185 lb) (09/16 1540)  BSA (Calculated - sq m): --  Pulse: 65 (09/16 1540)  BP: 105/70 (09/16 1540)  Temp: 98.3 °F (36.8 °C) (09/16  1540)  Do Not Use - Resp Rate: --  SpO2: 100 % (09/16 1540)    Physical Examination:    Constitutional: Patient is alert and oriented x 3, not in acute distress.  Eyes: Anicteric sclera. Pink conjunctiva.  Respiratory: Clear to auscultation and percussion. No rales.  No wheezes.  Cardiovascular: Regular rate and rhythm.   Gastrointestinal: Soft, non tender with good bowel sounds.  Extremities: No edema. No calf tenderness.  Lymphatics: There is no palpable lymphadenopathy throughout in the cervical, supraclavicular, or axillary regions.    Labs reviewed at this visit:  Lab Results   Component Value Date    WBC 4.5 09/16/2024    RBC 1.84 (L) 09/16/2024    HGB 8.2 (L) 09/16/2024    HCT 21.9 (L) 09/16/2024    .0 (H) 09/16/2024    MCH 44.6 (H) 09/16/2024    MCHC 37.4 (H) 09/16/2024    RDW 13.7 09/16/2024    .0 09/16/2024     Lab Results   Component Value Date     09/16/2024    K 3.9 09/16/2024     09/16/2024    CO2 24.0 09/16/2024    BUN 7 (L) 09/16/2024    CREATSERUM 0.54 (L) 09/16/2024    GLU 84 09/16/2024    CA 9.5 09/16/2024    ALKPHO 66 09/16/2024    ALT 36 09/16/2024    AST 27 09/16/2024    BILT 0.6 09/16/2024    ALB 4.3 09/16/2024    TP 8.4 (H) 09/16/2024        Radiologic imaging reviewed at this visit:    CTA Chest on 3/2/2023:  FINDINGS:    LUNGS:  A pleural-based area of opacity in the anterolateral aspect of the right lung base measuring up to 2.7 x 1.4 cm could represent a pulmonary infarct, with an area of rounded atelectasis, pulmonary mass, or other etiologies not entirely excluded.    Clinical correlation recommended along with follow-up to confirm stability/resolution.  PET-CT may also be of further value.  Pleural-based noncalcified pulmonary nodule along the right major fissure on image 138 series 5 measuring up to 6 mm.  Minimal  atelectasis/scarring elsewhere in the lungs.  VASCULATURE:    There are acute pulmonary emboli noted within the segmental and subsegmental  arterial branches of the right lower lobe.  There is an overall mild embolic burden.  THORACIC AORTA:    No aortic aneurysm or acute dissection.  MEDIASTINUM/KATHERINE:    Residual thymus the anterior mediastinum.  CARDIAC:    Unremarkable  PLEURA:    Trace right pleural effusion.  CHEST WALL:    Unremarkable  LIMITED ABDOMEN:    Unremarkable  BONES:    Mild degenerative changes in the spine with minimal rightward curvature.  OTHER:    None     Impression   CONCLUSION:       1. There are acute pulmonary emboli noted within the segmental and subsegmental arterial branches of the right lower lobe.  There is an overall mild embolic burden.     2. A pleural-based area of opacity in the anterolateral aspect of the right lung base measuring up to 2.7 x 1.4 cm could represent a pulmonary infarct, with an area of rounded atelectasis, pulmonary mass, or other etiologies not entirely excluded.    Clinical correlation recommended along with follow-up to confirm stability/resolution.  PET-CT may also be of further value.     3. 6 mm noncalcified pulmonary nodule along the right major fissure. Followup as per Fleischner criteria is suggested.       Assessment/Plan:    1  Sickle cell SS disease:  Hydroxyurea 2000 mg daily  Marked HGB F response at 34%    She is doing well with the current management. Continue folic acid 1 mg daily. She has chronic anemia likely from the chronic hemolytic anemia. I will follow her at three month intervals with labs. She will continue the hydroxyurea at the current dosing.    I have discussed the option of cellular gene therapy. We will hold off until next visit but will then consider consultation.    2  Intermittent sickle cell pain crises:    She is managing this with ibuprofen and Norco PRN. She will continue with the current management. We will refill these as needed.    3  Pulmonary embolism diagnosed in 3/2023   Diagnosed while on estrogen birth control    She is no longer on OCP and she has  stopped the anticoagulation.        Refugio Burr MD

## 2024-09-26 ENCOUNTER — OFFICE VISIT (OUTPATIENT)
Dept: OBGYN CLINIC | Facility: CLINIC | Age: 34
End: 2024-09-26
Payer: COMMERCIAL

## 2024-09-26 ENCOUNTER — LAB ENCOUNTER (OUTPATIENT)
Dept: LAB | Facility: HOSPITAL | Age: 34
End: 2024-09-26
Attending: INTERNAL MEDICINE
Payer: COMMERCIAL

## 2024-09-26 VITALS
HEIGHT: 72 IN | WEIGHT: 183.13 LBS | SYSTOLIC BLOOD PRESSURE: 106 MMHG | DIASTOLIC BLOOD PRESSURE: 66 MMHG | HEART RATE: 74 BPM | BODY MASS INDEX: 24.81 KG/M2

## 2024-09-26 DIAGNOSIS — Z11.3 SCREEN FOR STD (SEXUALLY TRANSMITTED DISEASE): ICD-10-CM

## 2024-09-26 DIAGNOSIS — Z01.419 WELL WOMAN EXAM WITH ROUTINE GYNECOLOGICAL EXAM: Primary | ICD-10-CM

## 2024-09-26 DIAGNOSIS — N89.8 VAGINAL LEUKORRHEA: ICD-10-CM

## 2024-09-26 LAB
HAV IGM SER QL: NONREACTIVE
HBV CORE IGM SER QL: NONREACTIVE
HBV SURFACE AG SERPL QL IA: NONREACTIVE
HCV AB SERPL QL IA: NONREACTIVE
T PALLIDUM AB SER QL IA: NONREACTIVE

## 2024-09-26 PROCEDURE — 87591 N.GONORRHOEAE DNA AMP PROB: CPT | Performed by: NURSE PRACTITIONER

## 2024-09-26 PROCEDURE — 80074 ACUTE HEPATITIS PANEL: CPT

## 2024-09-26 PROCEDURE — 99395 PREV VISIT EST AGE 18-39: CPT | Performed by: NURSE PRACTITIONER

## 2024-09-26 PROCEDURE — 3008F BODY MASS INDEX DOCD: CPT | Performed by: NURSE PRACTITIONER

## 2024-09-26 PROCEDURE — 36415 COLL VENOUS BLD VENIPUNCTURE: CPT

## 2024-09-26 PROCEDURE — 87389 HIV-1 AG W/HIV-1&-2 AB AG IA: CPT

## 2024-09-26 PROCEDURE — 86780 TREPONEMA PALLIDUM: CPT

## 2024-09-26 PROCEDURE — 3078F DIAST BP <80 MM HG: CPT | Performed by: NURSE PRACTITIONER

## 2024-09-26 PROCEDURE — 3074F SYST BP LT 130 MM HG: CPT | Performed by: NURSE PRACTITIONER

## 2024-09-26 PROCEDURE — 87491 CHLMYD TRACH DNA AMP PROBE: CPT | Performed by: NURSE PRACTITIONER

## 2024-09-26 PROCEDURE — 81514 NFCT DS BV&VAGINITIS DNA ALG: CPT | Performed by: NURSE PRACTITIONER

## 2024-09-26 NOTE — PROGRESS NOTES
Here for Routine Annual Exam  No concerns or questions.  She does note a slight increase in discharge  Menses are regular.  Contraception- Kyleena IUD.  She would like all STD screen  She is OK to defer pap until next year    ROS: No Cardiac, Respiratory, GI,  or Neurological symptoms.    PE:  GENERAL: well developed, well nourished, in no apparent distress  SKIN: no rashes, no suspicious lesions  HEENT: normal  NECK: supple; no thyroidmegaly, no adenopathy  LUNGS: clear to auscultation  CARDIOVASCULAR: normal S1, S2, RRR  BREASTS: firm, nontendder, no palpable masses or nodes, no nipple discharge, no skin changes, no axillary adenopathy,    ABDOMEN: Soft, non distended; non tender, no masses  GYNE/: External Genitalia: Normal without lesions or erythema                      Vagina: normal without lesions, scant discharge                      Uterus: mid, mobile, non tender, normal size                     Cervix: no lesions or CMT                     Adnexa: non tender, no masses, normal size  EXTREMITIES:  non tender without edema    A/P:   1. Well woman exam with routine gynecological exam  Regular self breast exams recommended  Pap deferred    2. Screen for STD (sexually transmitted disease)  - Chlamydia/Gc Amplification; Future  - HIV AG AB COMBO; Future  - Hepatitis Panel, Acute (4); Future  - T Pallidum Screening Cascade; Future    3. Vaginal leukorrhea  - Vaginitis Vaginosis PCR Panel; Future     Return to clinic 1 year for routine exam, or as needed with any concerns or question

## 2024-09-27 ENCOUNTER — APPOINTMENT (OUTPATIENT)
Dept: GENERAL RADIOLOGY | Facility: HOSPITAL | Age: 34
End: 2024-09-27
Payer: COMMERCIAL

## 2024-09-27 ENCOUNTER — HOSPITAL ENCOUNTER (EMERGENCY)
Facility: HOSPITAL | Age: 34
Discharge: HOME | End: 2024-09-28
Attending: EMERGENCY MEDICINE
Payer: COMMERCIAL

## 2024-09-27 VITALS
SYSTOLIC BLOOD PRESSURE: 126 MMHG | BODY MASS INDEX: 24.79 KG/M2 | OXYGEN SATURATION: 97 % | RESPIRATION RATE: 18 BRPM | WEIGHT: 183 LBS | TEMPERATURE: 98 F | HEIGHT: 72 IN | DIASTOLIC BLOOD PRESSURE: 70 MMHG | HEART RATE: 80 BPM

## 2024-09-27 DIAGNOSIS — S93.602A SPRAIN OF LEFT FOOT, INITIAL ENCOUNTER: Primary | ICD-10-CM

## 2024-09-27 LAB
BV BACTERIA DNA VAG QL NAA+PROBE: POSITIVE
C GLABRATA DNA VAG QL NAA+PROBE: NEGATIVE
C KRUSEI DNA VAG QL NAA+PROBE: NEGATIVE
C TRACH DNA SPEC QL NAA+PROBE: NEGATIVE
CANDIDA DNA VAG QL NAA+PROBE: NEGATIVE
N GONORRHOEA DNA SPEC QL NAA+PROBE: NEGATIVE
T VAGINALIS DNA VAG QL NAA+PROBE: NEGATIVE

## 2024-09-27 PROCEDURE — 99283 EMERGENCY DEPT VISIT LOW MDM: CPT

## 2024-09-27 PROCEDURE — 99284 EMERGENCY DEPT VISIT MOD MDM: CPT

## 2024-09-27 PROCEDURE — 73630 X-RAY EXAM OF FOOT: CPT

## 2024-09-27 RX ORDER — LIDOCAINE 50 MG/G
1 PATCH TOPICAL EVERY 24 HOURS
Qty: 14 PATCH | Refills: 0 | Status: SHIPPED | OUTPATIENT
Start: 2024-09-27 | End: 2024-09-28

## 2024-09-28 ENCOUNTER — PATIENT MESSAGE (OUTPATIENT)
Dept: OBGYN CLINIC | Facility: CLINIC | Age: 34
End: 2024-09-28

## 2024-09-28 RX ORDER — LIDOCAINE 50 MG/G
1 PATCH TOPICAL EVERY 24 HOURS
Qty: 14 PATCH | Refills: 0 | Status: SHIPPED | OUTPATIENT
Start: 2024-09-28

## 2024-09-28 NOTE — ED INITIAL ASSESSMENT (HPI)
PT states she accidentally struck the top of her left foot getting out of the shower appx 2.5 hours ago, has pain and swelling to that site.

## 2024-09-28 NOTE — ED PROVIDER NOTES
Patient Seen in: [facility] Emergency Department    History     Chief Complaint   Patient presents with    Leg or Foot Injury     Stated Complaint: Foot Injury    HPI    HPI: Lise Bartlett is a 34 year old female hx sickle cell anemia who presents after an injury to the left foot that occurred about 2.5 hours ago when she accidentally struck the top of her foot coming out of the shower on the tub when her shower towel rack fell as she was holding onto it and she lost her balance.  She did not hit her head or pass out.  She sustained a small bruise to her right forearm but denies significant pain in her right arm, elbow or hand.  Reports constant pain to the top of her left foot.  She denies pain in the left ankle, calf, shin, knee, or hip.  Denies any other injuries.  Took Norco at home which she is prescribed for her sickle cell anemia so as well as ibuprofen with some relief.  Denies numbness or tingling in her left foot nor any other complaints at this time.    Past Medical History:    History of pulmonary embolus (PE)    Sickle cell anemia (HCC)       Past Surgical History:   Procedure Laterality Date    Tonsillectomy              Family History   Problem Relation Age of Onset    Cancer Neg        Social History     Socioeconomic History    Marital status: Single   Tobacco Use    Smoking status: Never    Smokeless tobacco: Never   Vaping Use    Vaping status: Never Used   Substance and Sexual Activity    Alcohol use: Yes     Alcohol/week: 3.0 standard drinks of alcohol     Types: 1 Glasses of wine, 2 Standard drinks or equivalent per week     Comment: Weekends    Drug use: Yes     Types: Cannabis     Comment: regularly    Sexual activity: Yes     Partners: Male     Birth control/protection: I.U.D.     Comment: Emma   Other Topics Concern    Caffeine Concern No    Exercise Yes     Comment: 3-4x week    Seat Belt Yes     Social Determinants of Health     Financial Resource Strain: Low Risk   (3/6/2023)    Financial Resource Strain     Difficulty of Paying Living Expenses: Not very hard     Med Affordability: No   Transportation Needs: No Transportation Needs (3/6/2023)    Transportation Needs     Lack of Transportation: No    Received from [facility], [facility]    Social Connections    Received from [facility], [facility]    Housing Stability       Review of Systems    Positive for stated complaint: Foot Injury  Other systems are as noted in HPI.  Constitutional and vital signs reviewed.      All other systems reviewed and negative except as noted above.    PSFH elements reviewed from today and agreed except as otherwise stated in HPI.    Physical Exam     ED Triage Vitals [09/27/24 2134]   /72   Pulse 89   Resp 18   Temp 98.2 °F (36.8 °C)   Temp src Temporal   SpO2 97 %   O2 Device None (Room air)       Current:/70   Pulse 80   Temp 98.2 °F (36.8 °C) (Temporal)   Resp 18   Ht 182.9 cm (6')   Wt 83 kg   LMP 09/24/2024 (Approximate)   SpO2 97%   BMI 24.82 kg/m²         Physical Exam      MENTAL STATUS: Alert, oriented, and cooperative. No focal deficit  HEAD: Atraumatic  NECK: Supple, full range of motion without pain or paresthesias    CV: RRR no MRG, +2 radial and dp pulses bilaterally  RESP: lungs ctab no wheeze or crackles   EXTREMITIES: The L foot is tender over dorsum/lateral aspect with local soft tissue swelling, no laceration or deformity.  No ttp to base of fifth metatarsal.There is no ligamentous instability to anterior drawer to the left ankle. There is no notable deformity.  Achilles is palpated and intact functionally.  Full range of motion of the knee on that side without pain, tenderness or deformity. No proximal tib fib tenderness.patient is able to actively range her left hip, knee, and ankle without pain.  Small mildly tender bruise to the medial aspect of the mid right forearm without  deformity, no bony tenderness without the entire right upper extremity otherwise, full range of motion intact major joints of the right upper extremity.  NEURO:Sensation to touch is intact throughout bilat lower extremities and upper extremities. Strength 5/5 bilateral handgrip and elbow flexion/extension. Strength is slightly limited on full left ankle plantar/dorsiflexion 2/2 pain in foot but pt is able to get about 4+/5 strength testing, 5/5 on R ankle plantar/dorsiflexion, 5/5 bilat knee flexion/extension.   SKIN: No open wounds, no rashes.  PSYCH: Normal affect. Calm and cooperative.    ED Course   Labs Reviewed - No data to display    MDM     Lise Bartlett is a 34 year old female who presents after an injury to the left foot that occurred about 2.5 hours ago when she accidentally struck the top of her foot coming out of the shower.  She is well-appearing here with intact distal DP pulses, intact sensation, strength exam slightly limited on full plantar dorsiflexion of the left ankle secondary to pain in the foot.  I reviewed her x-ray and on my independent interpretation I do not see any evidence of fracture or dislocation though considered fracture, ligamentous injury, or sprain in the setting of her injury.  Will provide Ace wrap and lidocaine patch, pending x-ray plan for discharge with outpatient follow-up and if no improvement within the next few days follow-up with podiatry as outpatient for possible repeat imaging and further testing.    ED Course as of 09/28/24 0813  ------------------------------------------------------------  Time: 09/27 2313  Value: XR FOOT, COMPLETE (MIN 3 VIEWS), LEFT (CPT=73630)  Comment: Left foot, 3 views    Comparison: None      IMPRESSION:    No acute osseous abnormality.    No evidence of fracture or dislocation.    ------------------------------------------------------------  Time: 09/27 1455  Comment: Patient updated with results, prided Ace wrap and lidocaine  patches, she has Norco and ibuprofen at home which she will continue to use as needed.  She will weight-bear as tolerated, and return to the ER if new or worsening symptoms but otherwise follow-up with primary care as outpatient and if no improvement over the next 48 to 72 hours follow-up with podiatry within the next week.  She is comfortable with the discharge plan at this time.           Disposition and Plan     Clinical Impression:  1. Sprain of left foot, initial encounter        Disposition:  Discharge    Follow-up:  Justyn Blum MD  [address] [place] [id number]  [phone]    Schedule an appointment as soon as possible for a visit in 2 day(s)      Charles Vieira DPM  [address] [address]  [phone]    Schedule an appointment as soon as possible for a visit in 1 week(s)  As needed if not improving in 2-3 days    Charles Vieira DPM  [address] [place] [address]  [phone]    Schedule an appointment as soon as possible for a visit in 1 week(s)  As needed if not improving in 2-3 days    [facility] Emergency Department  [address]  [facility] [address]  [phone]  Go to  If symptoms worsen, immediately      Medications Prescribed:  Discharge Medication List as of 9/27/2024 11:36 PM        START taking these medications    Details   lidocaine 5 % External Patch Place 1 patch onto the skin daily., Normal, Disp-14 patch, R-0               Imani Mcmullen DO  Attending Physician  Emergency Medicine

## 2024-09-28 NOTE — DISCHARGE INSTRUCTIONS
Thank you for seeking care at [facility] Emergency Department.  You have been seen and evaluated.  The x-ray today did not show any broken bones.  As we discussed you likely have a sprain of your left foot.     We discussed the results of your workup   Please read the instructions provided   If given prescriptions, take as instructed    Remember, your care process does not end after your visit today. Please follow-up with your doctor within 1-2 days for a follow-up check to ensure you are  improving, to see if you need any further evaluation/testing, or to evaluate for any alternate diagnoses.     Please return to the emergency department if you develop chest pain, difficulty breathing, inability to drink liquids without vomiting, one sided numbness or weakness, slurred speech, severe headache, or if you develop any other new or concerning symptoms as these could be signs of more serious medical illness.    We hope you feel better.

## 2024-09-30 NOTE — TELEPHONE ENCOUNTER
From: Lise Bartlett  To: Rashmi Franklin  Sent: 9/28/2024 9:34 AM CDT  Subject: Prescription     Hello. Would it be possible to have my prescription sent to the [facility] at [address] [place]. That one is closer to me and not closed on weekends.

## 2024-11-01 DIAGNOSIS — D57.1 SICKLE CELL DISEASE WITHOUT CRISIS (HCC): ICD-10-CM

## 2024-11-01 RX ORDER — HYDROCODONE BITARTRATE AND ACETAMINOPHEN 10; 325 MG/1; MG/1
0.5 TABLET ORAL EVERY 4 HOURS PRN
Qty: 60 TABLET | Refills: 0 | Status: SHIPPED | OUTPATIENT
Start: 2024-11-01 | End: 2024-12-20

## 2024-11-01 NOTE — TELEPHONE ENCOUNTER
Patient need a refill on her Norco  mg. She is out of medication. Please send to [facility] [address] [place], [place] - [address] [facility] [phone], [phone]

## 2024-11-14 ENCOUNTER — HOSPITAL ENCOUNTER (EMERGENCY)
Facility: HOSPITAL | Age: 34
Discharge: HOME | End: 2024-11-15
Attending: EMERGENCY MEDICINE
Payer: COMMERCIAL

## 2024-11-14 DIAGNOSIS — D57.00 SICKLE CELL PAIN CRISIS (HCC): Primary | ICD-10-CM

## 2024-11-14 PROCEDURE — 96374 THER/PROPH/DIAG INJ IV PUSH: CPT

## 2024-11-14 PROCEDURE — 99285 EMERGENCY DEPT VISIT HI MDM: CPT

## 2024-11-14 PROCEDURE — 96375 TX/PRO/DX INJ NEW DRUG ADDON: CPT

## 2024-11-14 PROCEDURE — 99284 EMERGENCY DEPT VISIT MOD MDM: CPT

## 2024-11-14 PROCEDURE — 96376 TX/PRO/DX INJ SAME DRUG ADON: CPT

## 2024-11-15 VITALS
SYSTOLIC BLOOD PRESSURE: 101 MMHG | OXYGEN SATURATION: 100 % | HEART RATE: 61 BPM | RESPIRATION RATE: 20 BRPM | BODY MASS INDEX: 25 KG/M2 | WEIGHT: 185 LBS | DIASTOLIC BLOOD PRESSURE: 60 MMHG | TEMPERATURE: 98 F

## 2024-11-15 LAB
B-HCG UR QL: NEGATIVE
BASOPHILS # BLD: 0 X10(3) UL (ref 0–0.2)
BASOPHILS NFR BLD: 0 %
DEPRECATED RDW RBC AUTO: 60.7 FL (ref 35.1–46.3)
EOSINOPHIL # BLD: 0 X10(3) UL (ref 0–0.7)
EOSINOPHIL NFR BLD: 0 %
ERYTHROCYTE [DISTWIDTH] IN BLOOD BY AUTOMATED COUNT: 13.7 % (ref 11–15)
HCT VFR BLD AUTO: 20.3 %
HELMET CELLS BLD QL SMEAR: PRESENT
HGB BLD-MCNC: 7.6 G/DL
HGB RETIC QN AUTO: 42.4 PG (ref 28.2–36.6)
IMM RETICS NFR: 0.37 RATIO (ref 0.1–0.3)
LYMPHOCYTES NFR BLD: 4.51 X10(3) UL (ref 1–4)
LYMPHOCYTES NFR BLD: 85 %
MCH RBC QN AUTO: 45.8 PG (ref 26–34)
MCHC RBC AUTO-ENTMCNC: 37.4 G/DL (ref 31–37)
MCV RBC AUTO: 122.3 FL
MONOCYTES # BLD: 0.05 X10(3) UL (ref 0.1–1)
MONOCYTES NFR BLD: 1 %
NEUTROPHILS # BLD AUTO: 0.79 X10 (3) UL (ref 1.5–7.7)
NEUTROPHILS NFR BLD: 14 %
NEUTS HYPERSEG # BLD: 0.74 X10(3) UL (ref 1.5–7.7)
NRBC BLD MANUAL-RTO: 2 %
PLATELET # BLD AUTO: 351 10(3)UL (ref 150–450)
PLATELET MORPHOLOGY: NORMAL
RBC # BLD AUTO: 1.66 X10(6)UL
RETICS # AUTO: 72.4 X10(3) UL (ref 22.5–147.5)
RETICS/RBC NFR AUTO: 4.4 %
TOTAL CELLS COUNTED BLD: 100
WBC # BLD AUTO: 5.3 X10(3) UL (ref 4–11)

## 2024-11-15 PROCEDURE — 85007 BL SMEAR W/DIFF WBC COUNT: CPT | Performed by: EMERGENCY MEDICINE

## 2024-11-15 PROCEDURE — 85060 BLOOD SMEAR INTERPRETATION: CPT | Performed by: EMERGENCY MEDICINE

## 2024-11-15 PROCEDURE — 81025 URINE PREGNANCY TEST: CPT

## 2024-11-15 PROCEDURE — 85025 COMPLETE CBC W/AUTO DIFF WBC: CPT | Performed by: EMERGENCY MEDICINE

## 2024-11-15 PROCEDURE — 85027 COMPLETE CBC AUTOMATED: CPT | Performed by: EMERGENCY MEDICINE

## 2024-11-15 PROCEDURE — 85045 AUTOMATED RETICULOCYTE COUNT: CPT | Performed by: EMERGENCY MEDICINE

## 2024-11-15 RX ORDER — MORPHINE SULFATE 4 MG/ML
8 INJECTION, SOLUTION INTRAMUSCULAR; INTRAVENOUS ONCE
Status: COMPLETED | OUTPATIENT
Start: 2024-11-15 | End: 2024-11-15

## 2024-11-15 RX ORDER — ONDANSETRON 2 MG/ML
4 INJECTION INTRAMUSCULAR; INTRAVENOUS ONCE
Status: COMPLETED | OUTPATIENT
Start: 2024-11-15 | End: 2024-11-15

## 2024-11-15 RX ORDER — KETOROLAC TROMETHAMINE 15 MG/ML
15 INJECTION, SOLUTION INTRAMUSCULAR; INTRAVENOUS ONCE
Status: COMPLETED | OUTPATIENT
Start: 2024-11-15 | End: 2024-11-15

## 2024-11-15 NOTE — ED INITIAL ASSESSMENT (HPI)
Patient c/o back for the past week and has been taking norco and Ibuprofen for the pain. She states the pain has gotten worse today. Denies urinary issues. Hx of sick cell.

## 2024-11-15 NOTE — ED PROVIDER NOTES
Patient Seen in: [facility] Emergency Department      History     Chief Complaint   Patient presents with    Back Pain     Stated Complaint: sickle cell patient, back pain    Subjective:   HPI      34-year-old female history of sickle cell SS presents with back pain.  Patient reports sickle cell pain flare that feels like typical flare.  Pain in lower back, unimproved with Norco, NSAIDs.  No cough, fever, shortness of breath    Objective:     Past Medical History:    History of pulmonary embolus (PE)    Sickle cell anemia (HCC)              Past Surgical History:   Procedure Laterality Date    Tonsillectomy                  Social History     Socioeconomic History    Marital status: Single   Tobacco Use    Smoking status: Never    Smokeless tobacco: Never   Vaping Use    Vaping status: Never Used   Substance and Sexual Activity    Alcohol use: Yes     Alcohol/week: 3.0 standard drinks of alcohol     Types: 1 Glasses of wine, 2 Standard drinks or equivalent per week     Comment: Weekends    Drug use: Yes     Types: Cannabis     Comment: regularly    Sexual activity: Yes     Partners: Male     Birth control/protection: I.U.D.     Comment: [employer]   Other Topics Concern    Caffeine Concern No    Exercise Yes     Comment: 3-4x week    Seat Belt Yes     Social Drivers of Health     Financial Resource Strain: Low Risk  (3/6/2023)    Financial Resource Strain     Difficulty of Paying Living Expenses: Not very hard     Med Affordability: No   Transportation Needs: No Transportation Needs (3/6/2023)    Transportation Needs     Lack of Transportation: No    [facility]    Social Connections    [facility] from [facility], [facility]    Housing Stability                  Physical Exam     ED Triage Vitals [11/14/24 2307]   /71   Pulse 73   Resp 20   Temp 97.8 °F (36.6 °C)   Temp src Oral   SpO2 100 %   O2 Device  None (Room air)       Current Vitals:   Vital Signs  BP: 101/60  Pulse: 61  Resp: 20  Temp: 97.8 °F (36.6 °C)  Temp src: Oral  MAP (mmHg): 73    Oxygen Therapy  SpO2: 100 %  O2 Device: None (Room air)        Physical Exam  Vital signs reviewed. Nursing note reviewed.  Constitutional: Well-developed. Well-nourished. In no acute distress  HENT: Mucous membranes moist.   EYES: No scleral icterus or conjunctival injection.  NECK: Full ROM. Supple.   CARDIAC: Normal rate. Normal S1/ S2. 2+ distal pulses. No edema  PULM/CHEST: Clear to auscultation bilaterally. No wheezes  ABD: Soft, non-tender, non-distended.   RECTAL: deferred  Extremities: No obvious deformity  NEURO: Awake, alert, following commands, moving extremities, answering questions.   SKIN: Warm and dry. No rash or lesions.  PSYCH: Normal judgment. Normal affect.        ED Course     Labs Reviewed   CBC WITH DIFFERENTIAL WITH PLATELET - Abnormal; Notable for the following components:       Result Value    RBC 1.66 (*)     HGB 7.6 (*)     HCT 20.3 (*)     .3 (*)     MCH 45.8 (*)     MCHC 37.4 (*)     RDW-SD 60.7 (*)     Neutrophil Absolute Prelim 0.79 (*)     All other components within normal limits   RETICULOCYTE COUNT - Abnormal; Notable for the following components:    Retic% 4.4 (*)     Retic IRF 0.367 (*)     Reticulocyte Hemoglobin Equivalent 42.4 (*)     All other components within normal limits   POCT PREGNANCY URINE - Normal   MD BLOOD SMEAR CONSULT   MANUAL DIFFERENTIAL                   MDM      Assessment:Patient is a 34 year old female presenting to the ED due to sickle cell/back pain.    Comorbidities/chronic illnesses impacting care: sickle cell disease    History obtained from: patient    Laboratory results above were independently viewed and interpreted by me as documented below.  Radiology: ordered and independent interpretation performed. Decision making details documented below.   External records and previous hospitalization records  reviewed and documented below    Consideration of Social Determinants of Health and Impact on Medical Decision Making:  Housing/Transportation/Financial Strain/Access to healthcare/Food insecurity/family or Community support/Language and Literacy/Substance abuse/Mental health concerns/Disabilities     -none    Radiography/Imaging:  No orders to display           ED course  Patient arrives here with normal vitals, appears nontoxic overall.  Complaining of worsening sickle cell pain.  Reviewed her outpatient oncology notes.  States home [place], NSAIDs not working.  Has no infectious symptoms, no dyspnea, lower suspicion for acute chest or PE.  Will check basic labs, reticulocyte count, medicate for pain and reassess.    Progress note: Labs independently interpreted, chronic macrocytic anemia.  Reticulocyte count appropriate.  After first round of meds, patient reports minimal improvement in pain. Will re-dose.     Progress note: After second dose of morphine, patient feeling much improved.  Will discharge home, will return if symptoms worsen.    Time spent providing Critical Care Services to the patient (excluding time spent by the resident/mid-level provider and time spent performing separately billable procedures): 35 minutes.  Most of the time was spent at the bedside of the patient, reevaluating the patient and vital signs, explaining conditions and results to the patient and/or family, as well as speaking to the PMD and/or Consultant(s).              Medications   morphINE PF 4 MG/ML injection 8 mg (8 mg Intravenous Given 11/15/24 0038)   ondansetron (Zofran) 4 MG/2ML injection 4 mg (4 mg Intravenous Given 11/15/24 0038)   ketorolac (Toradol) 15 MG/ML injection 15 mg (15 mg Intravenous Given 11/15/24 0039)   morphINE PF 4 MG/ML injection 8 mg (8 mg Intravenous Given 11/15/24 0127)                 Medical Decision Making      Disposition and Plan     Clinical Impression:  1. Sickle cell pain crisis (HCC)          Disposition:  Discharge  11/15/2024  1:57 am    Follow-up:  [facility] Emergency Department  [address] [facility] [address]  [phone]  Follow up  If symptoms worsen          Medications Prescribed:  Current Discharge Medication List              Supplementary Documentation:

## 2024-12-20 ENCOUNTER — OFFICE VISIT (OUTPATIENT)
Age: 34
End: 2024-12-20
Attending: INTERNAL MEDICINE
Payer: COMMERCIAL

## 2024-12-20 VITALS
HEART RATE: 80 BPM | RESPIRATION RATE: 16 BRPM | WEIGHT: 187 LBS | HEIGHT: 72 IN | BODY MASS INDEX: 25.33 KG/M2 | OXYGEN SATURATION: 99 % | TEMPERATURE: 99 F | SYSTOLIC BLOOD PRESSURE: 116 MMHG | DIASTOLIC BLOOD PRESSURE: 71 MMHG

## 2024-12-20 DIAGNOSIS — D57.00 ANEMIA, SICKLE CELL WITH CRISIS (HCC): ICD-10-CM

## 2024-12-20 DIAGNOSIS — Z86.711 HISTORY OF PULMONARY EMBOLISM: ICD-10-CM

## 2024-12-20 DIAGNOSIS — D57.1 SICKLE CELL DISEASE WITHOUT CRISIS (HCC): ICD-10-CM

## 2024-12-20 LAB
ALBUMIN SERPL-MCNC: 4 G/DL (ref 3.2–4.8)
ALBUMIN/GLOB SERPL: 1 {RATIO} (ref 1–2)
ALP LIVER SERPL-CCNC: 58 U/L
ALT SERPL-CCNC: 24 U/L
ANION GAP SERPL CALC-SCNC: 6 MMOL/L (ref 0–18)
AST SERPL-CCNC: 25 U/L (ref ?–34)
BASOPHILS # BLD AUTO: 0.01 X10(3) UL (ref 0–0.2)
BASOPHILS NFR BLD AUTO: 0.2 %
BILIRUB SERPL-MCNC: 0.6 MG/DL (ref 0.3–1.2)
BUN BLD-MCNC: 7 MG/DL (ref 9–23)
CALCIUM BLD-MCNC: 9.1 MG/DL (ref 8.7–10.4)
CHLORIDE SERPL-SCNC: 111 MMOL/L (ref 98–112)
CO2 SERPL-SCNC: 23 MMOL/L (ref 21–32)
CREAT BLD-MCNC: 0.61 MG/DL
EGFRCR SERPLBLD CKD-EPI 2021: 121 ML/MIN/1.73M2 (ref 60–?)
EOSINOPHIL # BLD AUTO: 0.01 X10(3) UL (ref 0–0.7)
EOSINOPHIL NFR BLD AUTO: 0.2 %
ERYTHROCYTE [DISTWIDTH] IN BLOOD BY AUTOMATED COUNT: 14 %
FASTING STATUS PATIENT QL REPORTED: NO
GLOBULIN PLAS-MCNC: 4.1 G/DL (ref 2–3.5)
GLUCOSE BLD-MCNC: 79 MG/DL (ref 70–99)
HCT VFR BLD AUTO: 22.7 %
HGB BLD-MCNC: 8.3 G/DL
IMM GRANULOCYTES # BLD AUTO: 0 X10(3) UL (ref 0–1)
IMM GRANULOCYTES NFR BLD: 0 %
LDH SERPL L TO P-CCNC: 241 U/L
LYMPHOCYTES # BLD AUTO: 2.87 X10(3) UL (ref 1–4)
LYMPHOCYTES NFR BLD AUTO: 63.6 %
MCH RBC QN AUTO: 43.7 PG (ref 26–34)
MCHC RBC AUTO-ENTMCNC: 36.6 G/DL (ref 31–37)
MCV RBC AUTO: 119.5 FL
MONOCYTES # BLD AUTO: 0.15 X10(3) UL (ref 0.1–1)
MONOCYTES NFR BLD AUTO: 3.3 %
NEUTROPHILS # BLD AUTO: 1.47 X10 (3) UL (ref 1.5–7.7)
NEUTROPHILS # BLD AUTO: 1.47 X10(3) UL (ref 1.5–7.7)
NEUTROPHILS NFR BLD AUTO: 32.7 %
OSMOLALITY SERPL CALC.SUM OF ELEC: 287 MOSM/KG (ref 275–295)
PLATELET # BLD AUTO: 457 10(3)UL (ref 150–450)
POTASSIUM SERPL-SCNC: 4 MMOL/L (ref 3.5–5.1)
PROT SERPL-MCNC: 8.1 G/DL (ref 5.7–8.2)
RBC # BLD AUTO: 1.9 X10(6)UL
SODIUM SERPL-SCNC: 140 MMOL/L (ref 136–145)
WBC # BLD AUTO: 4.5 X10(3) UL (ref 4–11)

## 2024-12-20 RX ORDER — HYDROCODONE BITARTRATE AND ACETAMINOPHEN 10; 325 MG/1; MG/1
0.5 TABLET ORAL EVERY 4 HOURS PRN
Qty: 60 TABLET | Refills: 0 | Status: SHIPPED | OUTPATIENT
Start: 2024-12-20

## 2024-12-20 NOTE — PROGRESS NOTES
Cancer Center Report of Consultation    Patient Name: Lise Bartlett   YOB: 1990   Medical Record Number: [id number]   CSN: [id number]   Consulting Physician: Refugio Burr MD  Referring Physician(s): No ref. provider found  Date of Consultation: 2023     Reason for Consultation:  Lise Bartlett was seen today in the Cancer Center for evaluation and management of sickle cell SS disease and history of pulmonary embolism.    History of Present Illness:     She is doing reasonably well. She had recent flare of pain with ER visit. She overall is doing better now. She only takes occasional Norco PRN. She has no new complaints. She has no dyspnea or cough. She has no fever or sweats. She is working. She has no chest pain.    She has a diagnosis of sickle cell anemia SS disease. She has been followed at [facility].  She has been on hydroxyurea 2000 mg for many years. She had a HGB F of 34% in 2022. She has not had hospitalization for pain for many years. Prior to age 21 she had admissions. She had one admission when she was a child for acute chest syndrome. More recently she has controlled her intermittent pain with Ibuprofen 600 mg PRN and Norce 10/325 2 tabs PRN. She takes the Norce about twice per month. She takes the Ibuprofen more often.     She was admitted to [facility] in 3/2023 with acute chest pain. She had a CTA of the chest that showed acute pulmonary embolism. She was treated with apixaban 5 mg BID. .    Past Medical History:  Past Medical History:    History of pulmonary embolus (PE)    Sickle cell anemia (HCC)       Past Surgical History:  Past Surgical History:   Procedure Laterality Date    Tonsillectomy         Family Medical History:  Family History   Problem Relation Age of Onset    Cancer Neg        Gyne History:  OB History    Para Term  AB Living   0 0 0 0 0 0   SAB IAB Ectopic Multiple Live Births   0 0 0 0 0        Psychosocial History:  Social History     Socioeconomic History    Marital status: Single     Spouse name: Not on file    Number of children: Not on file    Years of education: Not on file    Highest education level: Not on file   Occupational History    Not on file   Tobacco Use    Smoking status: Never    Smokeless tobacco: Never   Vaping Use    Vaping status: Never Used   Substance and Sexual Activity    Alcohol use: Yes     Alcohol/week: 3.0 standard drinks of alcohol     Types: 1 Glasses of wine, 2 Standard drinks or equivalent per week     Comment: Weekends    Drug use: Yes     Types: Cannabis     Comment: regularly    Sexual activity: Yes     Partners: Male     Birth control/protection: I.U.D.     Comment: Emma   Other Topics Concern     Service Not Asked    Blood Transfusions Not Asked    Caffeine Concern No    Occupational Exposure Not Asked    Hobby Hazards Not Asked    Sleep Concern Not Asked    Stress Concern Not Asked    Weight Concern Not Asked    Special Diet Not Asked    Back Care Not Asked    Exercise Yes     Comment: 3-4x week    Bike Helmet Not Asked    Seat Belt Yes    Self-Exams Not Asked   Social History Narrative    Not on file     Social Drivers of Health     Financial Resource Strain: Low Risk  (3/6/2023)    Financial Resource Strain     Difficulty of Paying Living Expenses: Not very hard     Med Affordability: No   Food Insecurity: Not on file   Transportation Needs: No Transportation Needs (3/6/2023)    Transportation Needs     Lack of Transportation: No   Physical Activity: Not on file   Stress: Not on file   Social Connections: Unknown (3/18/2021)    [facility]    Social Connections     Conversations with friends/family/neighbors per week: Not on file   Housing Stability: Low Risk  (7/6/2021)    [facility]    Housing Stability     Mortgage  Payment Concerns?: Not on file     Number of Places Lived in the Last Year: Not on file     Unstable Housing?: Not on file       Allergies:   Allergies   Allergen Reactions    [place] Pollen Allergy Skin Test NAUSEA ONLY     Facial swelling and nausea       Current Medications:    Current Outpatient Medications:     HYDROcodone-acetaminophen  MG Oral Tab, Take 0.5 tablets by mouth every 4 (four) hours as needed for Pain., Disp: 60 tablet, Rfl: 0    lidocaine 5 % External Patch, Place 1 patch onto the skin daily., Disp: 14 patch, Rfl: 0    folic acid 1 MG Oral Tab, Take 1 tablet (1 mg total) by mouth daily., Disp: 90 tablet, Rfl: 3    ibuprofen 600 MG Oral Tab, Take 1 tablet (600 mg total) by mouth every 6 (six) hours as needed for Pain., Disp: 120 tablet, Rfl: 11    hydroxyurea 500 MG Oral Cap, Take 4 capsules (2,000 mg total) by mouth daily., Disp: 360 capsule, Rfl: 3    Review of Systems:    Constitutional: Negative for anorexia, fatigue, fevers, chills, night sweats and weight loss.  Eyes: Negative for visual disturbance, irritation and redness.  Respiratory: Negative for cough, hemoptysis, chest pain, or dyspnea.  Cardiovascular: Negative for angina, orthopnea or palpitations.  Gastrointestinal: Negative for nausea, vomiting, change in bowel habits, diarrhea, constipation and abdominal pain.  Integument/breast: Negative for rash, skin lesions, and pruritus.  Hematologic/lymphatic: Negative for easy bruising, bleeding, and lymphadenopathy.  Genitourinary: Negative for dysuria or hematuria    Psychiatric: The patient's mood was calm and appropriate for this visit.    The pertinent positives and negatives were described in the HPI and above. All other systems were negative.      Vital Signs:  Height: 182.9 cm (6' 0.01\") (12/20 1417)  Weight: 84.8 kg (187 lb) (12/20 1417)  BSA (Calculated - sq m): 2.07 sq meters (12/20 1417)  Pulse: 80 (12/20 1417)  BP: 116/71 (12/20 1417)  Temp: 98.8 °F (37.1 °C) (12/20  1417)  Do Not Use - Resp Rate: --  SpO2: 99 % (12/20 1417)    Physical Examination:    Constitutional: Patient is alert and oriented x 3, not in acute distress.  Eyes: Anicteric sclera. Pink conjunctiva.  Respiratory: Clear to auscultation and percussion. No rales.  No wheezes.  Cardiovascular: Regular rate and rhythm.   Gastrointestinal: Soft, non tender with good bowel sounds.  Extremities: No edema. No calf tenderness.  Lymphatics: There is no palpable lymphadenopathy throughout in the cervical, supraclavicular, or axillary regions.    Labs reviewed at this visit:  Lab Results   Component Value Date    WBC 5.3 11/15/2024    RBC 1.66 (L) 11/15/2024    HGB 7.6 (L) 11/15/2024    HCT 20.3 (L) 11/15/2024    .3 (H) 11/15/2024    MCH 45.8 (H) 11/15/2024    MCHC 37.4 (H) 11/15/2024    RDW 13.7 11/15/2024    .0 11/15/2024     Lab Results   Component Value Date     09/16/2024    K 3.9 09/16/2024     09/16/2024    CO2 24.0 09/16/2024    BUN 7 (L) 09/16/2024    CREATSERUM 0.54 (L) 09/16/2024    GLU 84 09/16/2024    CA 9.5 09/16/2024    ALKPHO 66 09/16/2024    ALT 36 09/16/2024    AST 27 09/16/2024    BILT 0.6 09/16/2024    ALB 4.3 09/16/2024    TP 8.4 (H) 09/16/2024        Radiologic imaging reviewed at this visit:    CTA Chest on 3/2/2023:  FINDINGS:    LUNGS:  A pleural-based area of opacity in the anterolateral aspect of the right lung base measuring up to 2.7 x 1.4 cm could represent a pulmonary infarct, with an area of rounded atelectasis, pulmonary mass, or other etiologies not entirely excluded.    Clinical correlation recommended along with follow-up to confirm stability/resolution.  PET-CT may also be of further value.  Pleural-based noncalcified pulmonary nodule along the right major fissure on image 138 series 5 measuring up to 6 mm.  Minimal  atelectasis/scarring elsewhere in the lungs.  VASCULATURE:    There are acute pulmonary emboli noted within the segmental and subsegmental arterial  branches of the right lower lobe.  There is an overall mild embolic burden.  THORACIC AORTA:    No aortic aneurysm or acute dissection.  MEDIASTINUM/KATHERINE:    Residual thymus the anterior mediastinum.  CARDIAC:    Unremarkable  PLEURA:    Trace right pleural effusion.  CHEST WALL:    Unremarkable  LIMITED ABDOMEN:    Unremarkable  BONES:    Mild degenerative changes in the spine with minimal rightward curvature.  OTHER:    None     Impression   CONCLUSION:       1. There are acute pulmonary emboli noted within the segmental and subsegmental arterial branches of the right lower lobe.  There is an overall mild embolic burden.     2. A pleural-based area of opacity in the anterolateral aspect of the right lung base measuring up to 2.7 x 1.4 cm could represent a pulmonary infarct, with an area of rounded atelectasis, pulmonary mass, or other etiologies not entirely excluded.    Clinical correlation recommended along with follow-up to confirm stability/resolution.  PET-CT may also be of further value.     3. 6 mm noncalcified pulmonary nodule along the right major fissure. Followup as per Fleischner criteria is suggested.       Assessment/Plan:    1  Sickle cell SS disease:  Hydroxyurea 2000 mg daily  Marked HGB F response at 34%    She is doing well with the current management. Continue folic acid 1 mg daily. She has chronic anemia likely from the chronic hemolytic anemia. I will follow her at three month intervals with labs. She will continue the hydroxyurea at the current dosing.    2  Intermittent sickle cell pain crises:    She is managing this with ibuprofen and Norco PRN. She will continue with the current management. We will refill these as needed.    3  Pulmonary embolism diagnosed in 3/2023   Diagnosed while on estrogen birth control    She is no longer on OCP and she has stopped the anticoagulation.    She will be transferring her care to the [facility] system due to her insurance.        Refugio ASHTON  MD [place]

## 2025-01-29 ENCOUNTER — HOSPITAL ENCOUNTER (EMERGENCY)
Facility: HOSPITAL | Age: 35
Discharge: HOME | End: 2025-01-29
Attending: EMERGENCY MEDICINE
Payer: COMMERCIAL

## 2025-01-29 VITALS
OXYGEN SATURATION: 99 % | HEART RATE: 102 BPM | RESPIRATION RATE: 16 BRPM | SYSTOLIC BLOOD PRESSURE: 110 MMHG | DIASTOLIC BLOOD PRESSURE: 68 MMHG | TEMPERATURE: 100 F

## 2025-01-29 DIAGNOSIS — D57.00 SICKLE CELL DISEASE WITH CRISIS (HCC): ICD-10-CM

## 2025-01-29 DIAGNOSIS — J11.1 INFLUENZA: Primary | ICD-10-CM

## 2025-01-29 DIAGNOSIS — M54.9 BACK PAIN WITH RADIATION: ICD-10-CM

## 2025-01-29 LAB
FLUAV + FLUBV RNA SPEC NAA+PROBE: NEGATIVE
FLUAV + FLUBV RNA SPEC NAA+PROBE: POSITIVE
RSV RNA SPEC NAA+PROBE: NEGATIVE
SARS-COV-2 RNA RESP QL NAA+PROBE: NOT DETECTED

## 2025-01-29 PROCEDURE — 99284 EMERGENCY DEPT VISIT MOD MDM: CPT

## 2025-01-29 PROCEDURE — 0241U SARS-COV-2/FLU A AND B/RSV BY PCR (GENEXPERT): CPT | Performed by: EMERGENCY MEDICINE

## 2025-01-29 PROCEDURE — 96372 THER/PROPH/DIAG INJ SC/IM: CPT

## 2025-01-29 PROCEDURE — 0241U SARS-COV-2/FLU A AND B/RSV BY PCR (GENEXPERT): CPT

## 2025-01-29 RX ORDER — KETOROLAC TROMETHAMINE 30 MG/ML
30 INJECTION, SOLUTION INTRAMUSCULAR; INTRAVENOUS ONCE
Status: COMPLETED | OUTPATIENT
Start: 2025-01-29 | End: 2025-01-29

## 2025-01-29 RX ORDER — MORPHINE SULFATE 4 MG/ML
4 INJECTION, SOLUTION INTRAMUSCULAR; INTRAVENOUS ONCE
Status: COMPLETED | OUTPATIENT
Start: 2025-01-29 | End: 2025-01-29

## 2025-01-29 NOTE — ED INITIAL ASSESSMENT (HPI)
Patient to ed via private vehicle co of fever with uri x yesterday hx of sickle cell last tylenol x 0900

## 2025-01-29 NOTE — ED PROVIDER NOTES
Patient Seen in: [facility] Emergency Department    History     Chief Complaint   Patient presents with    Upper Respiratory Infection       HPI    34-year-old female presents ER with complaint of upper respiratory infection.  Patient states she started having symptoms today.  Patient complain of congestion as well as fever.  Patient with a past medical history of sickle cell anemia states he started to have back pains which she thinks is from her sickle cell.    History reviewed.   Past Medical History:    History of pulmonary embolus (PE)    Sickle cell anemia (HCC)       History reviewed.   Past Surgical History:   Procedure Laterality Date    Tonsillectomy           Medications :  Prescriptions Prior to Admission[1]     Family History   Problem Relation Age of Onset    Cancer Neg        Smoking Status:   Social History     Socioeconomic History    Marital status: Single   Tobacco Use    Smoking status: Never    Smokeless tobacco: Never   Vaping Use    Vaping status: Never Used   Substance and Sexual Activity    Alcohol use: Yes     Alcohol/week: 3.0 standard drinks of alcohol     Types: 1 Glasses of wine, 2 Standard drinks or equivalent per week     Comment: Weekends    Drug use: Yes     Types: Cannabis     Comment: regularly    Sexual activity: Yes     Partners: Male     Birth control/protection: I.U.D.     Comment: Emma   Other Topics Concern    Caffeine Concern No    Exercise Yes     Comment: 3-4x week    Seat Belt Yes       ROS  All pertinent positives for the review of systems are mentioned in the HPI  All other organ systems are reviewed and are negative.    Constitutional and vital signs reviewed.      Social History and Family History elements reviewed from today, pertinent positives to the presenting problem noted.    Physical Exam     ED Triage Vitals [01/29/25 1352]   /74   Pulse 112   Resp 18   Temp (!) 102.3 °F (39.1 °C)   Temp src Oral   SpO2 100 %   O2 Device None (Room air)        All measures to prevent infection transmission during my interaction with the patient were taken. The patient was already wearing a droplet mask on my arrival to the room. Personal protective equipment including droplet mask, eye protection, and gloves were worn throughout the duration of the exam.  Handwashing was performed prior to and after the exam.  Stethoscope and any equipment used during my examination was cleaned with super sani-cloth germicidal wipes following the exam.     Physical Exam  Vitals and nursing note reviewed.   Constitutional:       Appearance: She is well-developed.   HENT:      Head: Normocephalic and atraumatic.      Right Ear: External ear normal.      Left Ear: External ear normal.      Nose: Nose normal.   Eyes:      Conjunctiva/sclera: Conjunctivae normal.      Pupils: Pupils are equal, round, and reactive to light.   Cardiovascular:      Rate and Rhythm: Normal rate and regular rhythm.      Heart sounds: Normal heart sounds.   Pulmonary:      Effort: Pulmonary effort is normal.      Breath sounds: Normal breath sounds.   Abdominal:      General: Bowel sounds are normal.      Palpations: Abdomen is soft.   Musculoskeletal:         General: Normal range of motion.      Cervical back: Normal range of motion and neck supple.   Skin:     General: Skin is warm and dry.   Neurological:      General: No focal deficit present.      Mental Status: She is alert and oriented to person, place, and time. Mental status is at baseline.      Cranial Nerves: No cranial nerve deficit.      Sensory: No sensory deficit.      Motor: No weakness.      Coordination: Coordination normal.      Deep Tendon Reflexes: Reflexes are normal and symmetric.   Psychiatric:         Behavior: Behavior normal.         Thought Content: Thought content normal.         Judgment: Judgment normal.         ED Course        Labs Reviewed   SARS-COV-2/FLU A AND B/RSV BY PCR (GENEXPERT) - Abnormal; Notable for the following  components:       Result Value    Influenza A by PCR Positive (*)     All other components within normal limits    Narrative:     This test is intended for the qualitative detection and differentiation of SARS-CoV-2, influenza A, influenza B, and respiratory syncytial virus (RSV) viral RNA in nasopharyngeal or nares swabs from individuals suspected of respiratory viral infection consistent with COVID-19 by their healthcare provider. Signs and symptoms of respiratory viral infection due to SARS-CoV-2, influenza, and RSV can be similar.                                    Test performed using the Xpert Xpress SARS-CoV-2/FLU/RSV (real time RT-PCR)  assay on the [employer] instrument, [employer], [place] [address].                   This test is being used under the Food and Drug Administration's Emergency Use Authorization.                                    The authorized Fact Sheet for Healthcare Providers for this assay is available upon request from the laboratory.         Imaging Results Available and Reviewed while in ED: No results found.  ED Medications Administered:   Medications   ketorolac (Toradol) 30 MG/ML injection 30 mg (30 mg Intramuscular Given 1/29/25 1551)   morphINE PF 4 MG/ML injection 4 mg (4 mg Intramuscular Given 1/29/25 1551)         MDM     Vitals:    01/29/25 1352   BP: 117/74   Pulse: 112   Resp: 18   Temp: (!) 102.3 °F (39.1 °C)   TempSrc: Oral   SpO2: 100%     *I personally reviewed and interpreted all ED vitals.  I also personally reviewed all labs and imaging if ordered    Pulse Ox: 100%, Room air, Normal     Monitor Interpretation:   normal sinus rhythm    Differential Diagnosis/ Diagnostic Considerations: Sickle cell crisis, viral syndrome, URI, COVID-19    Medical Record Review: I personally reviewed available prior medical records for any recent pertinent discharge summaries, testing, and procedures and reviewed those reports.    Complicating Factors: The patient already has does not  have any pertinent problems on file. to contribute to the complexity of this ED evaluation.    Medical Decision Making  34-year-old female presents ER with complaint of bodyaches chills and back pain.  Patient tested positive for influenza A.  Patient given a shot of Toradol as well as morphine to help with her back pain.  Patient with history of sickle cell disease.  Patient feels comfortably discharged home and states she has Norco at home for pain.  Patient's  bedside made aware of the discharge plan disposition.    Problems Addressed:  Back pain with radiation: acute illness or injury  Influenza: acute illness or injury  Sickle cell disease with crisis (HCC): acute illness or injury    Amount and/or Complexity of Data Reviewed  Independent Historian:      Details: Patient is feeling over states that the symptoms started early this morning with complaint of fever and bodyaches.  Labs: ordered. Decision-making details documented in ED Course.    Risk  Prescription drug management.        Condition upon leaving the department: Stable    Disposition and Plan     Clinical Impression:  1. Influenza    2. Sickle cell disease with crisis (HCC)    3. Back pain with radiation        Disposition:  Discharge    Follow-up:  Yusra Curtis MD  [address] [place] [id number]  [phone]    Schedule an appointment as soon as possible for a visit  If symptoms worsen      Medications Prescribed:  Current Discharge Medication List                 [1] (Not in a hospital admission)

## 2025-01-29 NOTE — LETTER
Date & Time: 2/4/2025, 12:50 PM  Patient: Lise Bartlett  Encounter Provider(s):    Anam Smith DO       To Whom It May Concern:    Lise Bartlett was seen and treated in our department on 1/29/2025. She can return to work when fever free for 24 hours without medication.    If you have any questions or concerns, please do not hesitate to call.        _____________________________  Physician/APC Signature

## 2025-02-04 DIAGNOSIS — D57.1 SICKLE CELL DISEASE WITHOUT CRISIS (HCC): ICD-10-CM

## 2025-02-04 RX ORDER — HYDROCODONE BITARTRATE AND ACETAMINOPHEN 10; 325 MG/1; MG/1
0.5 TABLET ORAL EVERY 4 HOURS PRN
Qty: 60 TABLET | Refills: 0 | Status: SHIPPED | OUTPATIENT
Start: 2025-02-04

## 2025-04-10 ENCOUNTER — HOSPITAL ENCOUNTER (EMERGENCY)
Facility: HOSPITAL | Age: 35
Discharge: HOME | End: 2025-04-11
Attending: EMERGENCY MEDICINE
Payer: COMMERCIAL

## 2025-04-10 ENCOUNTER — APPOINTMENT (OUTPATIENT)
Dept: GENERAL RADIOLOGY | Facility: HOSPITAL | Age: 35
End: 2025-04-10
Attending: EMERGENCY MEDICINE
Payer: COMMERCIAL

## 2025-04-10 DIAGNOSIS — D57.00 SICKLE CELL PAIN CRISIS (HCC): Primary | ICD-10-CM

## 2025-04-10 LAB
ALBUMIN SERPL-MCNC: 4.1 G/DL (ref 3.2–4.8)
ALBUMIN/GLOB SERPL: 1.1 {RATIO} (ref 1–2)
ALP LIVER SERPL-CCNC: 65 U/L (ref 37–98)
ALT SERPL-CCNC: 30 U/L (ref 10–49)
ANION GAP SERPL CALC-SCNC: 1 MMOL/L (ref 0–18)
AST SERPL-CCNC: 29 U/L (ref ?–34)
B-HCG SERPL-ACNC: <2.6 MIU/ML (ref ?–4.2)
B-HCG UR QL: POSITIVE
BASOPHILS # BLD AUTO: 0.02 X10(3) UL (ref 0–0.2)
BASOPHILS NFR BLD AUTO: 0.4 %
BILIRUB SERPL-MCNC: 0.5 MG/DL (ref 0.3–1.2)
BUN BLD-MCNC: 8 MG/DL (ref 9–23)
BUN/CREAT SERPL: 11.9 (ref 10–20)
CALCIUM BLD-MCNC: 8.8 MG/DL (ref 8.7–10.4)
CHLORIDE SERPL-SCNC: 111 MMOL/L (ref 98–112)
CO2 SERPL-SCNC: 27 MMOL/L (ref 21–32)
CREAT BLD-MCNC: 0.67 MG/DL (ref 0.55–1.02)
DEPRECATED RDW RBC AUTO: 59.7 FL (ref 35.1–46.3)
EGFRCR SERPLBLD CKD-EPI 2021: 118 ML/MIN/1.73M2 (ref 60–?)
EOSINOPHIL # BLD AUTO: 0.02 X10(3) UL (ref 0–0.7)
EOSINOPHIL NFR BLD AUTO: 0.4 %
ERYTHROCYTE [DISTWIDTH] IN BLOOD BY AUTOMATED COUNT: 13.8 % (ref 11–15)
GLOBULIN PLAS-MCNC: 3.7 G/DL (ref 2–3.5)
GLUCOSE BLD-MCNC: 81 MG/DL (ref 70–99)
HCG SERPL QL: NEGATIVE
HCT VFR BLD AUTO: 22.4 % (ref 35–48)
HGB BLD-MCNC: 8.3 G/DL (ref 12–16)
HGB RETIC QN AUTO: 41.7 PG (ref 28.2–36.6)
IMM GRANULOCYTES # BLD AUTO: 0 X10(3) UL (ref 0–1)
IMM GRANULOCYTES NFR BLD: 0 %
IMM RETICS NFR: 0.32 RATIO (ref 0.1–0.3)
LYMPHOCYTES # BLD AUTO: 4.68 X10(3) UL (ref 1–4)
LYMPHOCYTES NFR BLD AUTO: 82.8 %
MCH RBC QN AUTO: 44.9 PG (ref 26–34)
MCHC RBC AUTO-ENTMCNC: 37.1 G/DL (ref 31–37)
MCV RBC AUTO: 121.1 FL (ref 80–100)
MONOCYTES # BLD AUTO: 0.22 X10(3) UL (ref 0.1–1)
MONOCYTES NFR BLD AUTO: 3.9 %
NEUTROPHILS # BLD AUTO: 0.71 X10 (3) UL (ref 1.5–7.7)
NEUTROPHILS # BLD AUTO: 0.71 X10(3) UL (ref 1.5–7.7)
NEUTROPHILS NFR BLD AUTO: 12.5 %
OSMOLALITY SERPL CALC.SUM OF ELEC: 285 MOSM/KG (ref 275–295)
PLATELET # BLD AUTO: 506 10(3)UL (ref 150–450)
PLATELET MORPHOLOGY: NORMAL
POTASSIUM SERPL-SCNC: 3.7 MMOL/L (ref 3.5–5.1)
PROT SERPL-MCNC: 7.8 G/DL (ref 5.7–8.2)
RBC # BLD AUTO: 1.85 X10(6)UL (ref 3.8–5.3)
RETICS # AUTO: 83.1 X10(3) UL (ref 22.5–147.5)
RETICS/RBC NFR AUTO: 4.5 % (ref 0.5–2.5)
SODIUM SERPL-SCNC: 139 MMOL/L (ref 136–145)
TROPONIN I SERPL HS-MCNC: <3 NG/L (ref ?–34)
WBC # BLD AUTO: 5.7 X10(3) UL (ref 4–11)

## 2025-04-10 PROCEDURE — 85025 COMPLETE CBC W/AUTO DIFF WBC: CPT | Performed by: EMERGENCY MEDICINE

## 2025-04-10 PROCEDURE — 84703 CHORIONIC GONADOTROPIN ASSAY: CPT | Performed by: EMERGENCY MEDICINE

## 2025-04-10 PROCEDURE — 96361 HYDRATE IV INFUSION ADD-ON: CPT

## 2025-04-10 PROCEDURE — 84484 ASSAY OF TROPONIN QUANT: CPT | Performed by: EMERGENCY MEDICINE

## 2025-04-10 PROCEDURE — 93010 ELECTROCARDIOGRAM REPORT: CPT

## 2025-04-10 PROCEDURE — 96375 TX/PRO/DX INJ NEW DRUG ADDON: CPT

## 2025-04-10 PROCEDURE — 99285 EMERGENCY DEPT VISIT HI MDM: CPT

## 2025-04-10 PROCEDURE — 71045 X-RAY EXAM CHEST 1 VIEW: CPT | Performed by: EMERGENCY MEDICINE

## 2025-04-10 PROCEDURE — 80053 COMPREHEN METABOLIC PANEL: CPT | Performed by: EMERGENCY MEDICINE

## 2025-04-10 PROCEDURE — 96374 THER/PROPH/DIAG INJ IV PUSH: CPT

## 2025-04-10 PROCEDURE — 81025 URINE PREGNANCY TEST: CPT

## 2025-04-10 PROCEDURE — 85045 AUTOMATED RETICULOCYTE COUNT: CPT | Performed by: EMERGENCY MEDICINE

## 2025-04-10 PROCEDURE — 96376 TX/PRO/DX INJ SAME DRUG ADON: CPT

## 2025-04-10 PROCEDURE — 84702 CHORIONIC GONADOTROPIN TEST: CPT | Performed by: EMERGENCY MEDICINE

## 2025-04-10 PROCEDURE — 93005 ELECTROCARDIOGRAM TRACING: CPT

## 2025-04-10 RX ORDER — MORPHINE SULFATE 4 MG/ML
8 INJECTION, SOLUTION INTRAMUSCULAR; INTRAVENOUS ONCE
Status: COMPLETED | OUTPATIENT
Start: 2025-04-10 | End: 2025-04-10

## 2025-04-10 RX ORDER — KETOROLAC TROMETHAMINE 15 MG/ML
15 INJECTION, SOLUTION INTRAMUSCULAR; INTRAVENOUS ONCE
Status: COMPLETED | OUTPATIENT
Start: 2025-04-10 | End: 2025-04-10

## 2025-04-11 VITALS
HEART RATE: 61 BPM | RESPIRATION RATE: 12 BRPM | TEMPERATURE: 98 F | SYSTOLIC BLOOD PRESSURE: 111 MMHG | DIASTOLIC BLOOD PRESSURE: 67 MMHG | OXYGEN SATURATION: 99 %

## 2025-04-11 LAB
ATRIAL RATE: 75 BPM
P AXIS: 67 DEGREES
P-R INTERVAL: 154 MS
Q-T INTERVAL: 394 MS
QRS DURATION: 84 MS
QTC CALCULATION (BEZET): 439 MS
R AXIS: 32 DEGREES
T AXIS: 31 DEGREES
VENTRICULAR RATE: 75 BPM

## 2025-04-11 PROCEDURE — 96376 TX/PRO/DX INJ SAME DRUG ADON: CPT

## 2025-04-11 RX ORDER — MORPHINE SULFATE 4 MG/ML
4 INJECTION, SOLUTION INTRAMUSCULAR; INTRAVENOUS ONCE
Status: COMPLETED | OUTPATIENT
Start: 2025-04-11 | End: 2025-04-11

## 2025-04-11 NOTE — ED INITIAL ASSESSMENT (HPI)
Hx of sickle cell. Experiencing pain on chest, back and left side x1 week. Unable to manage pain at home.

## 2025-04-11 NOTE — ED PROVIDER NOTES
Patient Seen in: [facility] Emergency Department    History     Chief Complaint   Patient presents with    Sickle Cell     Stated Complaint: sickle cell pain    Subjective:   HPI  35-year-old female with sickle cell SS disease, who presents for evaluation of pain.  She has pain located in the low back, left ribs and chest which started today.  She does report that the symptoms feel like a typical sickle cell flare.  No numbness tingling or weakness of the legs, urinary or bowel retention or incontinence, no trauma.  No cough or fever, no shortness of breath.  No abdominal pain or vomiting.  Pain uncontrolled at home with NSAIDs and Norco.        Objective:     Past Medical History:    History of pulmonary embolus (PE)    Sickle cell anemia (HCC)              Past Surgical History:   Procedure Laterality Date    Tonsillectomy                  Social History     Socioeconomic History    Marital status: Single   Tobacco Use    Smoking status: Never    Smokeless tobacco: Never   Vaping Use    Vaping status: Never Used   Substance and Sexual Activity    Alcohol use: Yes     Alcohol/week: 3.0 standard drinks of alcohol     Types: 1 Glasses of wine, 2 Standard drinks or equivalent per week     Comment: Weekends    Drug use: Yes     Types: Cannabis     Comment: regularly    Sexual activity: Yes     Partners: Male     Birth control/protection: I.U.D.     Comment: Emma   Other Topics Concern    Caffeine Concern No    Exercise Yes     Comment: 3-4x week    Seat Belt Yes     Social Drivers of Health     Transportation Needs: No Transportation Needs (3/6/2023)    Transportation Needs     Lack of Transportation: No    Received from [facility]    Housing Stability                  Physical Exam     ED Triage Vitals [04/10/25 2216]   /47   Pulse 74   Resp 18   Temp 98.2 °F (36.8 °C)   Temp src Oral   SpO2 100 %   O2 Device None (Room air)       Current Vitals:   Vital Signs  BP: 111/67  Pulse:  61  Resp: 12  Temp: 98.2 °F (36.8 °C)  Temp src: Oral  MAP (mmHg): 81    Oxygen Therapy  SpO2: 99 %  O2 Device: None (Room air)        Physical Exam  Vitals and nursing note reviewed.   Constitutional:       Appearance: She is well-developed.   HENT:      Head: Normocephalic and atraumatic.      Mouth/Throat:      Mouth: Mucous membranes are moist.      Pharynx: Oropharynx is clear.   Eyes:      Extraocular Movements: Extraocular movements intact.      Pupils: Pupils are equal, round, and reactive to light.   Cardiovascular:      Rate and Rhythm: Normal rate and regular rhythm.      Heart sounds: Normal heart sounds.   Pulmonary:      Effort: Pulmonary effort is normal.      Breath sounds: Normal breath sounds.   Abdominal:      General: There is no distension.      Palpations: Abdomen is soft.      Tenderness: There is no abdominal tenderness. There is no right CVA tenderness or left CVA tenderness.   Musculoskeletal:         General: Normal range of motion.      Cervical back: Normal range of motion.   Skin:     General: Skin is warm.      Capillary Refill: Capillary refill takes less than 2 seconds.   Neurological:      General: No focal deficit present.      Mental Status: She is alert.      Comments: No focal deficits       Differential diagnose includes but is not limited to sickle cell pain crisis, acute chest syndrome, viral syndrome, hemopneumothorax, PNA    ED Course     Labs Reviewed   CBC WITH DIFFERENTIAL WITH PLATELET - Abnormal; Notable for the following components:       Result Value    RBC 1.85 (*)     HGB 8.3 (*)     HCT 22.4 (*)     .1 (*)     MCH 44.9 (*)     MCHC 37.1 (*)     RDW-SD 59.7 (*)     .0 (*)     Neutrophil Absolute Prelim 0.71 (*)     Neutrophil Absolute 0.71 (*)     Lymphocyte Absolute 4.68 (*)     All other components within normal limits   COMP METABOLIC PANEL (14) - Abnormal; Notable for the following components:    BUN 8 (*)     Globulin  3.7 (*)     All other  components within normal limits   RETICULOCYTE COUNT - Abnormal; Notable for the following components:    Retic% 4.5 (*)     Retic IRF 0.322 (*)     Reticulocyte Hemoglobin Equivalent 41.7 (*)     All other components within normal limits   RBC MORPHOLOGY SCAN - Abnormal; Notable for the following components:    RBC Morphology See morphology below (*)     Macrocytosis 2+ (*)     Sickle Cells 2+ (*)     All other components within normal limits   POCT PREGNANCY URINE - Abnormal; Notable for the following components:    POCT Urine Pregnancy Positive (*)     All other components within normal limits   TROPONIN I HIGH SENSITIVITY - Normal   HCG, BETA SUBUNIT, QUAL - Normal   HCG, BETA SUBUNIT (QUANT PREGNANCY TEST) - Normal     EKG    Rate, intervals and axes as noted on EKG Report.  Rate: 75  Rhythm: Sinus Rhythm  Reading: No STEMI, no ectopy            Preliminary Radiology Report  [facility], [facility]  [phone] - Phone    [facility]    NAME: MICHAEL URBANO    DATE OF EXAM: 04/10/2025  Patient No:  [id number]  Physician:  EDIN  YOB: 1990    Past Medical History (entered by Technologist):    Reason For Exam (entered by Technologist):  Left sided chest pain x2 days.  Other Notes (entered by Technologist): Pod 1. Room 13.   Shielded  Pregnant,consent signed    Additional Information (per [facility]):      X-ray chest 22:52      IMPRESSION:  -Questionable lucency beneath the left hemidiaphragm, likely bowel gas but consider correlation with follow-up CT or upright/supine abdominal radiograph to exclude sub-diaphragmatic free air if clinically indicated.  -No focal pulmonary consolidation, pleural effusion, or pneumothorax.  -Normal cardiomediastinal silhouette.  -No acute osseous abnormality.      Case dictated and faxed at 12:58 AM EST.  If you would like to discuss this case directly please call [phone]  ext [phone].  If you can't reach me at this number, do not  leave a voicemail.  Please call [phone] ext [phone] and ask for the next available Radiologist.    Donald Walton MD                         MDM          Medical Decision Making    Vitals are stable in the ED, lungs are clear, she is ambulatory and there is no acute distress.  Initial point-of-care urine pregnancy test was faintly positive however quantitative hCG was negative.  CBC with ongoing anemia, CMP unremarkable, reticulocyte count appropriately elevated.  High-sensitivity troponin negative.  Per my independent interpretation of chest x-ray, no lung consolidation.  She has no abdominal pain whatsoever, and intra-abdominal free air is felt to be unlikely.  She was treated with fluids, Toradol, and multiple doses of morphine.  On reevaluation, she feels much better and pain is resolved.  She is comfortable with discharge home and will follow-up with her hematologist.    Problems Addressed:  Sickle cell pain crisis (HCC): complicated acute illness or injury with systemic symptoms that poses a threat to life or bodily functions    Amount and/or Complexity of Data Reviewed  Labs: ordered. Decision-making details documented in ED Course.  Radiology: ordered and independent interpretation performed. Decision-making details documented in ED Course.  ECG/medicine tests: ordered and independent interpretation performed. Decision-making details documented in ED Course.    Risk  Parenteral controlled substances.  Decision regarding hospitalization.        Disposition and Plan     Clinical Impression:  1. Sickle cell pain crisis (HCC)         Disposition:  Discharge  4/11/2025 12:37 am    Follow-up:  Jhon Mak  [address]  [address]  [facility] [id number] [phone]    Follow up in 3 day(s)            Medications Prescribed:  Current Discharge Medication List          Supplementary Documentation:

## 2025-07-19 ENCOUNTER — APPOINTMENT (OUTPATIENT)
Dept: GENERAL RADIOLOGY | Facility: HOSPITAL | Age: 35
End: 2025-07-19
Attending: EMERGENCY MEDICINE
Payer: COMMERCIAL

## 2025-07-19 ENCOUNTER — HOSPITAL ENCOUNTER (EMERGENCY)
Facility: HOSPITAL | Age: 35
Discharge: HOME | End: 2025-07-19
Attending: EMERGENCY MEDICINE
Payer: COMMERCIAL

## 2025-07-19 ENCOUNTER — APPOINTMENT (OUTPATIENT)
Dept: CT IMAGING | Facility: HOSPITAL | Age: 35
End: 2025-07-19
Attending: EMERGENCY MEDICINE
Payer: COMMERCIAL

## 2025-07-19 VITALS
HEIGHT: 72 IN | RESPIRATION RATE: 19 BRPM | SYSTOLIC BLOOD PRESSURE: 118 MMHG | WEIGHT: 180 LBS | BODY MASS INDEX: 24.38 KG/M2 | OXYGEN SATURATION: 100 % | TEMPERATURE: 97 F | DIASTOLIC BLOOD PRESSURE: 66 MMHG | HEART RATE: 64 BPM

## 2025-07-19 DIAGNOSIS — R07.9 ACUTE CHEST PAIN: ICD-10-CM

## 2025-07-19 DIAGNOSIS — D57.00 SICKLE CELL PAIN CRISIS (HCC): Primary | ICD-10-CM

## 2025-07-19 DIAGNOSIS — R51.9 ACUTE NONINTRACTABLE HEADACHE, UNSPECIFIED HEADACHE TYPE: ICD-10-CM

## 2025-07-19 DIAGNOSIS — I51.7 MILD CARDIOMEGALY: ICD-10-CM

## 2025-07-19 LAB
ANION GAP SERPL CALC-SCNC: 4 MMOL/L (ref 0–18)
B-HCG UR QL: NEGATIVE
BASOPHILS # BLD AUTO: 0.02 X10(3) UL (ref 0–0.2)
BASOPHILS NFR BLD AUTO: 0.4 %
BUN BLD-MCNC: 8 MG/DL (ref 9–23)
BUN/CREAT SERPL: 13.1 (ref 10–20)
CALCIUM BLD-MCNC: 8.2 MG/DL (ref 8.7–10.4)
CHLORIDE SERPL-SCNC: 111 MMOL/L (ref 98–112)
CO2 SERPL-SCNC: 26 MMOL/L (ref 21–32)
CREAT BLD-MCNC: 0.61 MG/DL (ref 0.55–1.02)
D DIMER PPP FEU-MCNC: 1.02 UG/ML FEU (ref ?–0.5)
DEPRECATED RDW RBC AUTO: 59.1 FL (ref 35.1–46.3)
EGFRCR SERPLBLD CKD-EPI 2021: 120 ML/MIN/1.73M2 (ref 60–?)
EOSINOPHIL # BLD AUTO: 0.02 X10(3) UL (ref 0–0.7)
EOSINOPHIL NFR BLD AUTO: 0.4 %
ERYTHROCYTE [DISTWIDTH] IN BLOOD BY AUTOMATED COUNT: 14.3 % (ref 11–15)
GLUCOSE BLD-MCNC: 65 MG/DL (ref 70–99)
GLUCOSE BLDC GLUCOMTR-MCNC: 127 MG/DL (ref 70–99)
HCT VFR BLD AUTO: 20.2 % (ref 35–48)
HGB BLD-MCNC: 7.5 G/DL (ref 12–16)
HGB RETIC QN AUTO: 41.2 PG (ref 28.2–36.6)
IMM GRANULOCYTES # BLD AUTO: 0.01 X10(3) UL (ref 0–1)
IMM GRANULOCYTES NFR BLD: 0.2 %
IMM RETICS NFR: 0.36 RATIO (ref 0.1–0.3)
LYMPHOCYTES # BLD AUTO: 3.96 X10(3) UL (ref 1–4)
LYMPHOCYTES NFR BLD AUTO: 69.7 %
MAGNESIUM SERPL-MCNC: 1.9 MG/DL (ref 1.6–2.6)
MCH RBC QN AUTO: 43.1 PG (ref 26–34)
MCHC RBC AUTO-ENTMCNC: 37.1 G/DL (ref 31–37)
MCV RBC AUTO: 116.1 FL (ref 80–100)
MONOCYTES # BLD AUTO: 0.16 X10(3) UL (ref 0.1–1)
MONOCYTES NFR BLD AUTO: 2.8 %
NEUTROPHILS # BLD AUTO: 1.51 X10 (3) UL (ref 1.5–7.7)
NEUTROPHILS # BLD AUTO: 1.51 X10(3) UL (ref 1.5–7.7)
NEUTROPHILS NFR BLD AUTO: 26.5 %
OSMOLALITY SERPL CALC.SUM OF ELEC: 288 MOSM/KG (ref 275–295)
PLATELET # BLD AUTO: 341 10(3)UL (ref 150–450)
POTASSIUM SERPL-SCNC: 3.1 MMOL/L (ref 3.5–5.1)
RBC # BLD AUTO: 1.74 X10(6)UL (ref 3.8–5.3)
RETICS # AUTO: 97.8 X10(3) UL (ref 22.5–147.5)
RETICS/RBC NFR AUTO: 5.6 % (ref 0.5–2.5)
SODIUM SERPL-SCNC: 141 MMOL/L (ref 136–145)
TROPONIN I SERPL HS-MCNC: 3 NG/L (ref ?–34)
WBC # BLD AUTO: 5.7 X10(3) UL (ref 4–11)

## 2025-07-19 PROCEDURE — 70496 CT ANGIOGRAPHY HEAD: CPT | Performed by: EMERGENCY MEDICINE

## 2025-07-19 PROCEDURE — 82962 GLUCOSE BLOOD TEST: CPT

## 2025-07-19 PROCEDURE — 71260 CT THORAX DX C+: CPT | Performed by: EMERGENCY MEDICINE

## 2025-07-19 PROCEDURE — 99285 EMERGENCY DEPT VISIT HI MDM: CPT

## 2025-07-19 PROCEDURE — 84484 ASSAY OF TROPONIN QUANT: CPT | Performed by: EMERGENCY MEDICINE

## 2025-07-19 PROCEDURE — 81025 URINE PREGNANCY TEST: CPT

## 2025-07-19 PROCEDURE — 85379 FIBRIN DEGRADATION QUANT: CPT | Performed by: EMERGENCY MEDICINE

## 2025-07-19 PROCEDURE — 93010 ELECTROCARDIOGRAM REPORT: CPT

## 2025-07-19 PROCEDURE — 96376 TX/PRO/DX INJ SAME DRUG ADON: CPT

## 2025-07-19 PROCEDURE — 71045 X-RAY EXAM CHEST 1 VIEW: CPT | Performed by: EMERGENCY MEDICINE

## 2025-07-19 PROCEDURE — 85045 AUTOMATED RETICULOCYTE COUNT: CPT | Performed by: EMERGENCY MEDICINE

## 2025-07-19 PROCEDURE — 80048 BASIC METABOLIC PNL TOTAL CA: CPT | Performed by: EMERGENCY MEDICINE

## 2025-07-19 PROCEDURE — 96374 THER/PROPH/DIAG INJ IV PUSH: CPT

## 2025-07-19 PROCEDURE — 85025 COMPLETE CBC W/AUTO DIFF WBC: CPT | Performed by: EMERGENCY MEDICINE

## 2025-07-19 PROCEDURE — 83735 ASSAY OF MAGNESIUM: CPT | Performed by: EMERGENCY MEDICINE

## 2025-07-19 PROCEDURE — 93005 ELECTROCARDIOGRAM TRACING: CPT

## 2025-07-19 PROCEDURE — 96361 HYDRATE IV INFUSION ADD-ON: CPT

## 2025-07-19 PROCEDURE — 96375 TX/PRO/DX INJ NEW DRUG ADDON: CPT

## 2025-07-19 RX ORDER — KETOROLAC TROMETHAMINE 15 MG/ML
15 INJECTION, SOLUTION INTRAMUSCULAR; INTRAVENOUS ONCE
Status: COMPLETED | OUTPATIENT
Start: 2025-07-19 | End: 2025-07-19

## 2025-07-19 RX ORDER — DIPHENHYDRAMINE HYDROCHLORIDE 50 MG/ML
25 INJECTION, SOLUTION INTRAMUSCULAR; INTRAVENOUS ONCE
Status: COMPLETED | OUTPATIENT
Start: 2025-07-19 | End: 2025-07-19

## 2025-07-19 RX ORDER — METOCLOPRAMIDE HYDROCHLORIDE 5 MG/ML
10 INJECTION INTRAMUSCULAR; INTRAVENOUS ONCE
Status: COMPLETED | OUTPATIENT
Start: 2025-07-19 | End: 2025-07-19

## 2025-07-19 RX ORDER — MORPHINE SULFATE 4 MG/ML
4 INJECTION, SOLUTION INTRAMUSCULAR; INTRAVENOUS ONCE
Status: COMPLETED | OUTPATIENT
Start: 2025-07-19 | End: 2025-07-19

## 2025-07-19 RX ORDER — POTASSIUM CHLORIDE 1.5 G/1.58G
40 POWDER, FOR SOLUTION ORAL ONCE
Status: COMPLETED | OUTPATIENT
Start: 2025-07-19 | End: 2025-07-19

## 2025-07-20 LAB
ATRIAL RATE: 77 BPM
P AXIS: 47 DEGREES
P-R INTERVAL: 158 MS
Q-T INTERVAL: 394 MS
QRS DURATION: 84 MS
QTC CALCULATION (BEZET): 445 MS
R AXIS: 34 DEGREES
T AXIS: 36 DEGREES
VENTRICULAR RATE: 77 BPM

## 2025-07-20 NOTE — ED PROVIDER NOTES
Patient Seen in: [facility] Emergency Department    History     Chief Complaint   Patient presents with    Headache    Chest Pain Angina     Stated Complaint:     HPI    35 year old female hx of sickle cell SS, remote hx of blood clots when she was on birth control, presenting with headache, low back pain, and chest pain. Pt reports that this morning she woke up with a generalized frontal headache. Denies maximal onset/sudden onset. Pain has since been constant and progressively worsening throughout the day, and earlier today also developed generalized throbbing chest pressure and low back pain, states they feel 'connected' to the headache somehow. Pt notes has had headaches before but never lasting this long nor this severe. +photophobia, but no vision change nor diplopia. Reports generalized low back pain, no trauma or fall. She has had chest pain and back pain with sickle cell flares before, but this feels somehow worse/different. Took home norco and motrin without relief. No dysuria, hematuria or frequency. No n/v/d. No abdominal pain. No numbness/tingling/weakness in her arms or legs. No bowel/bladder incontinence or retention. No saddle anesthesia. No fever or chills. No hx of DM or cancer.       Past Medical History[1]    Past Surgical History[2]         Family History[3]    Short Social Hx on File[4]    Review of Systems    Positive for stated complaint:   Other systems are as noted in HPI.  Constitutional and vital signs reviewed.      All other systems reviewed and negative except as noted above.    PSFH elements reviewed from today and agreed except as otherwise stated in HPI.    Physical Exam     ED Triage Vitals [07/19/25 1951]   /78   Pulse 75   Resp 18   Temp 97.4 °F (36.3 °C)   Temp src Temporal   SpO2 100 %   O2 Device None (Room air)       Current:/66   Pulse 64   Temp 97.4 °F (36.3 °C) (Temporal)   Resp 19   Ht 182.9 cm (6')   Wt 81.6 kg   LMP 07/01/2025 (Approximate)    SpO2 100%   BMI 24.41 kg/m²   PULSE % on RA         Physical Exam    Constitutional: no acute distress. Appears uncomfortable but nontoxic appearing   HENT: mmm, no lesions,  Neck: normal range of motion, no tenderness, supple.  Eyes: PERRL, EOMI, conjunctiva normal, no discharge. Sclera anicteric.  Cardiovascular: RRR no MRG, 2+ radial and dp pulses bilaterally   Respiratory: Normal breath sounds, no respiratory distress, no wheezing, no crackles   GI: Bowel sounds normal, Soft, no tenderness, no masses  : No CVA tenderness.  Back: no reproducible midline or paraspinal T or L spine ttp, no stepoff nor deformity   Skin: Warm, dry, no erythema, no rash.  Musculoskeletal: Intact distal pulses, no edema, no tenderness, no cyanosis, no clubbing. Good range of motion in all major joints.   Neurologic: Alert & oriented x 3, antigrav x4 extremities. Strength is 5/5 bilateral upper extremities on handgrip, elbow flexion/extension, shoulder lateral abduction/adduction, bilateral lower extremities on hip flexion and ankle plantar/dorsiflexion. SILT bilat UE and LE throughout and symmetric. Finger to nose intact bilaterally. no focal deficits noted.  Psych: Calm, cooperative, nl affect      ED Course     Labs Reviewed   CBC WITH DIFFERENTIAL WITH PLATELET - Abnormal; Notable for the following components:       Result Value    RBC 1.74 (*)     HGB 7.5 (*)     HCT 20.2 (*)     .1 (*)     MCH 43.1 (*)     MCHC 37.1 (*)     RDW-SD 59.1 (*)     All other components within normal limits   BASIC METABOLIC PANEL (8) - Abnormal; Notable for the following components:    Glucose 65 (*)     Potassium 3.1 (*)     BUN 8 (*)     Calcium, Total 8.2 (*)     All other components within normal limits   RETICULOCYTE COUNT - Abnormal; Notable for the following components:    Retic% 5.6 (*)     Retic IRF 0.363 (*)     Reticulocyte Hemoglobin Equivalent 41.2 (*)     All other components within normal limits   D-DIMER - Abnormal;  Notable for the following components:    D-Dimer 1.02 (*)     All other components within normal limits   POCT GLUCOSE - Abnormal; Notable for the following components:    POC Glucose  127 (*)     All other components within normal limits   TROPONIN I HIGH SENSITIVITY - Normal   MAGNESIUM - Normal   POCT PREGNANCY URINE - Normal     EKG  NSR rate 77 bpm, normal axis, normal intervals, no stemi       MDM     Monitor Interpretation:  Sinus rhythm     HEART score for chest pain  History- (Highly suspicious 2pt, Mod 1pt, slightly 0pt)        0  ECG- (significant ST deviation 2pt, Non spec 1pt, nl 0pt)  0  AGE- (>65 2pt, 45-64 1 pt, Under 45 0 pt)    0  Risk Factors- ( DM,HTN,Chol, fhx CAD, BMI>30, hx CAD)  ( >3 or hx CAD 2pt, 1-2 risks 1pt, None 0pt)  1  Troponin- ( 3 times nl 2pt, 2 times nl 1pt, nl 0pt   0         TOTAL  1    SCORE 0-3: 2.5% MACE over next 6 weeks consider D/C outpatient F/U  SCORE 4-6: 20.3% MACE over next 6 weeks consider admit  SCORE 7-10:72.7% MACE over next 6 weeks consider early invasive strategy.    Patient has a HEART score of 1, plan will be likely discharge.    Miko AJ, Marilynn BE, Armando GABRIEL. Chest pain in the emergency room: value of the HEART score. Neth Heart J. 2008 [place]y;16(6):191-6. PubMed PMID: 65783407; PubMed Central PMCID: [id number].  Aortic Dissection Risk Assesment:    High risk Conditions (Marfan, Fhx,Known aortic valve disease, known dissection or recent aortic manipulation) no  High Risk Pain Features (Severe. Abrupt Ripping or tearing) no  High Risk Exam Features (pulse deficit, BP difference, focal neuro deficit,murmur of aortic insufficiency, hypotension or shock)  no       MDM      This patient presents with worsening headache as well as chest pressure/back pain. No focal neuro deficits or pulse deficits on exam.     Differential diagnoses considered includes, but is not limited to:   Sickle cell crisis  Less likely acute chest syndrome given lack of hypoxia, cough or fever    Less likely but considered pneumonia, PE (cannot exclude by PERC rule given VTE history), ACS  Migraine or tension headache vs CVT, low suspicion ruptured SAH or aneurysm    Will obtain the following tests: cbc, bmp, trop, mag, dimer, upreg, retic count, cxr, EKG   Will administer the following medications/therapies: IV NS bolus, toradol, benadryl, reglan, morphine     Please see ED course for my independent review of these tests/imaging results.    Chronic conditions affecting care: sickle cell anemia     Workup and medications considered but not ordered: n/a     Social Determinants of Health that impacted care: n/a     ED Course as of 07/20/25 0144  ------------------------------------------------------------  Time: 07/19 2048  Value: POCT urine pregnancy: Negative  Comment: (Reviewed)  ------------------------------------------------------------  Time: 07/19 2054  Value: POCT urine pregnancy: Negative  Comment: (Reviewed)  ------------------------------------------------------------  Time: 07/19 2145  Value: POCT urine pregnancy: Negative  Comment: (Reviewed)  ------------------------------------------------------------  Time: 07/19 2252  Value: CT VENOGRAPHY BRAIN (W +WO)(CPT=70496)  Comment: CT HEAD WITHOUT CONTRAST  CT ANGIOGRAM AND VENOGRAM HEAD    COMPARISON: None    IMPRESSION    HEAD WITHOUT:    No intracranial hemorrhage, hydrocephalus or acute fracture.  The gray-white differentiation is preserved.    Visualized sinuses appear normal.  The mastoids are well-aerated.   The orbits are unremarkable.      CTA/V HEAD:    The intracranial carotid arteries, paired middle and anterior cerebral arteries appear widely patent.  The vertebral arteries, basilar artery and posterior cerebral arteries appear unremarkable.    No identifiable sinus/venous thrombosis.    No identifiable aneurysm.    ------------------------------------------------------------  Time: 07/19 2252  Value: CT CHEST PE AORTA (IV ONLY)  (CPT=71260)  Comment: CTA PULMONARY/CHEST    COMPARISON: 11/7/23    IMPRESSION    PULMONARY ARTERIES: There is no acute or chronic filling defect to suggest thromboemboli to the level of the proximal subsegmental arteries. The pulmonary artery is normal in diameter.    HEART: Mild cardiomegaly without pericardial effusion. There is no imaging evidence of right heart strain.    THORACIC AORTA/MEDIASTINUM: No aneurysm. No definite dissection, although ascending aortic pulsation artifact may obscure subtle intimal abnormalities.  Borderline size bilateral hilar lymph nodes.  Anterior mediastinal residual thymic tissue    LUNGS: No consolidation, patchy groundglass opacities or pulmonary edema. No pleural effusions or pneumothorax.    BONES: No displaced fractures.    UPPER ABDOMEN: Moderate fluid-filled gastric distention.    ------------------------------------------------------------  Time: 07/19 2257  Comment: Patient reassessed, feeling much better, headache is now 5 out of 10 and chest pain is much improved, requests if she can get a little more morphine she would feel comfortable enough to go home and f;u with her hematology team as outpatient. Discussed cardiomegaly seen on ct, advised f/u with cardiology. Advised on strict return precautions, pt comfortable with DC plan at this time          Disposition and Plan     Clinical Impression:  1. Sickle cell pain crisis (HCC)    2. Acute nonintractable headache, unspecified headache type    3. Acute chest pain    4. Mild cardiomegaly         Disposition:  Discharge  7/19/2025 11:22 pm    Follow-up:  Jhon Mak  [address]  [address]  [facility] [id number] [phone]    Schedule an appointment as soon as possible for a visit in 1 week(s)      [facility] Emergency Department  [address] [id number] [phone]  Go to  If symptoms worsen, right away    Aziza Pedroza DO  [address]  [address]  [place]  [place] [address]  [phone]    Schedule an appointment as soon as possible for a visit in 1 week(s)            Medications Prescribed:  Discharge Medication List as of 7/19/2025 11:45 PM          Supplementary Documentation:                                     Imani Mcmullen DO  Attending Physician  Emergency Medicine                               [1]   Past Medical History:   History of pulmonary embolus (PE)    Sickle cell anemia (HCC)   [2]   Past Surgical History:  Procedure Laterality Date    Tonsillectomy     [3]   Family History  Problem Relation Age of Onset    Cancer Neg    [4]   Social History  Socioeconomic History    Marital status: Single   Tobacco Use    Smoking status: Never    Smokeless tobacco: Never   Vaping Use    Vaping status: Never Used   Substance and Sexual Activity    Alcohol use: Yes     Alcohol/week: 3.0 standard drinks of alcohol     Types: 1 Glasses of wine, 2 Standard drinks or equivalent per week     Comment: Weekends    Drug use: Yes     Types: Cannabis     Comment: regularly    Sexual activity: Yes     Partners: Male     Birth control/protection: I.U.D.     Comment: Emma   Other Topics Concern    Caffeine Concern No    Exercise Yes     Comment: 3-4x week    Seat Belt Yes     Social Drivers of Health     Transportation Needs: No Transportation Needs (3/6/2023)    Transportation Needs     Lack of Transportation: No    Received from [facility]    Housing Stability

## 2025-07-20 NOTE — ED INITIAL ASSESSMENT (HPI)
Pt presents to ED for headache,. States started this am, now also has pain to back and chest as well as nausea.

## 2025-07-20 NOTE — DISCHARGE INSTRUCTIONS
Thank you for seeking care at [facility] Emergency Department.  You have been seen and evaluated for chest discomfort     We discussed the results of your workup. Your labs, EKG, and chest x-ray did not show severe findings   Please read the instructions provided   If given prescriptions, take as instructed    Remember, your care process does not end after your visit today. Please follow-up with your doctor within 1-2 days for a follow-up check to ensure you are  improving, to see if you need any further evaluation/testing, or to evaluate for any alternate diagnoses.    Please return to the emergency department if you develop severe and persistent chest pain, difficulty breathing, back pain, abdominal pain, lightheadedness or dizziness, passing out, new swelling, fevers, coughing up blood, feeling ill, not acting normally, worsening symptoms, or for any other concerns as these can be signs of a medical emergency.     We hope you feel better.

## (undated) NOTE — Clinical Note
Pt was contacted for TCM. Pt has an appt on 3/8/23. Medication list reviewed. Pt reports she is better. Pt has an appt with Ped Onc on 3/17/23 and has a call into OB/Gyne. Thank you!

## (undated) NOTE — LETTER
Date & Time: 4/11/2025, 12:38 AM  Patient: Lise Bartlett  Encounter Provider(s):    Reta Escalante MD       To Whom It May Concern:    Lise Bartlett was seen and treated in our department on 4/10/2025. She should not return to work until 4/12/25 .    If you have any questions or concerns, please do not hesitate to call.        _____________________________  Physician/APC Signature